# Patient Record
Sex: MALE | Race: WHITE | NOT HISPANIC OR LATINO | Employment: OTHER | URBAN - METROPOLITAN AREA
[De-identification: names, ages, dates, MRNs, and addresses within clinical notes are randomized per-mention and may not be internally consistent; named-entity substitution may affect disease eponyms.]

---

## 2017-01-04 ENCOUNTER — ANTICOAGULATION MONITORING (OUTPATIENT)
Dept: VASCULAR LAB | Facility: MEDICAL CENTER | Age: 82
End: 2017-01-04

## 2017-01-04 LAB — INR PPP: 2.3 (ref 2–3.5)

## 2017-01-04 NOTE — PROGRESS NOTES
OP Anticoagulation Telephone Note    Date: 1/4/2017  Anticoagulation Summary as of 1/4/2017     INR goal 2.0-3.0   Selected INR 2.3 (12/31/2016)   Maintenance plan 2 mg (2 mg x 1) on Mon, Wed; 4 mg (2 mg x 2) all other days   Weekly total 24 mg   No change documented Patricia Nur, Med Ass't   Plan last modified Ghazal Mckenzie, PHARMD (6/15/2016)   Next INR check 1/16/2017   Priority Routine   Target end date Indefinite    Indications   Atrial fibrillation [427.31] (Resolved) [I48.91]         Anticoagulation Episode Summary     INR check location     Preferred lab     Send INR reminders to     [facility]      Anticoagulation Care Providers     Provider Role Specialty Phone number    Bacilio Escalante M.D. Referring Cardiology [phone]    Eda Ugalde, ARTEMD Responsible          Anticoagulation Patient Findings   Negatives Missed Doses, Extra Doses, Medication Changes, Antibiotic Use, Diet Changes, Dental/Other Procedures, Hospitalization, Bleeding Gums, Nose Bleeds, Blood in Urine, Blood in Stool, Any Bruising, Other Complaints      Plan:  Left message on patient's answering machine/ voicemail. Instructed patient to call back with any concerns regarding any unusual bleeding or bruising, any medication or diet changes, or any signs or symptoms of thrombosis. Instructed patient to resume medication as outlined above. Patient to follow up in 2 weeks.      Patricia Nur, Medical Assistant    I have reviewed and agree with the plan above on  1/10/2017    Eda Ugalde, Pharm D

## 2017-01-16 LAB — INR PPP: 2.7 (ref 2–3.5)

## 2017-01-18 ENCOUNTER — ANTICOAGULATION MONITORING (OUTPATIENT)
Dept: VASCULAR LAB | Facility: MEDICAL CENTER | Age: 82
End: 2017-01-18

## 2017-01-18 NOTE — PROGRESS NOTES
Anticoagulation Summary as of 1/18/2017     INR goal 2.0-3.0   Selected INR 2.7 (1/16/2017)   Maintenance plan 2 mg (2 mg x 1) on Mon, Wed; 4 mg (2 mg x 2) all other days   Weekly total 24 mg   Plan last modified Ghazal Mckenzie, PHARMD (6/15/2016)   Next INR check 1/30/2017   Priority Routine   Target end date Indefinite    Indications   Atrial fibrillation [427.31] (Resolved) [I48.91]         Anticoagulation Episode Summary     INR check location     Preferred lab     Send INR reminders to     [facility]      Anticoagulation Care Providers     Provider Role Specialty Phone number    Bacilio Escalante M.D. Referring Cardiology [phone]    ARTEM VenturaD Responsible          Anticoagulation Patient Findings    Left voicemail message to report a therapeutic INR of 2.7.  Pt to continue with current warfarin dosing regimen. Requested pt contact the clinic for any s/s of unusual bleeding, bruising, clotting or any changes to diet or medication. FU 2 weeks.  Vincenzo Overton, PHARMD

## 2017-01-30 ENCOUNTER — ANTICOAGULATION MONITORING (OUTPATIENT)
Dept: VASCULAR LAB | Facility: MEDICAL CENTER | Age: 82
End: 2017-01-30

## 2017-01-30 LAB — INR PPP: 3.3 (ref 2–3.5)

## 2017-01-30 NOTE — PROGRESS NOTES
Anticoagulation Summary as of 1/30/2017     INR goal 2.0-3.0   Selected INR 3.3! (1/30/2017)   Maintenance plan 2 mg (2 mg x 1) on Mon, Wed; 4 mg (2 mg x 2) all other days   Weekly total 24 mg   Plan last modified Ghazal Mckenzie, PHARMD (6/15/2016)   Next INR check 2/13/2017   Priority Routine   Target end date Indefinite    Indications   Atrial fibrillation [427.31] (Resolved) [I48.91]         Anticoagulation Episode Summary     INR check location     Preferred lab     Send INR reminders to     [facility]      Anticoagulation Care Providers     Provider Role Specialty Phone number    Bacilio Escalante M.D. Referring Cardiology [phone]    ARTEM VenturaD Responsible          Anticoagulation Patient Findings   Positives Diet Changes    Negatives Missed Doses, Extra Doses, Medication Changes, Antibiotic Use, Dental/Other Procedures, Hospitalization, Bleeding Gums, Nose Bleeds, Blood in Urine, Blood in Stool, Any Bruising, Other Complaints    Comments Eating less greens recently        Spoke with patient today regarding supratherapeutic INR of 3.3.  Patient denies any signs/symptoms of bruising or bleeding or any changes in diet and medications.  Instructed patient to call clinic with any questions or concerns.  Patient was on vacation and eating less greens than he normally would.  Instructed him to HOLD X 1, then resume current warfarin regimen.  Follow up in 2 weeks.    Vincenzo Overton, PHARMD

## 2017-02-13 ENCOUNTER — APPOINTMENT (RX ONLY)
Dept: URBAN - METROPOLITAN AREA CLINIC 29 | Facility: CLINIC | Age: 82
Setting detail: DERMATOLOGY
End: 2017-02-13

## 2017-02-13 DIAGNOSIS — D18.0 HEMANGIOMA: ICD-10-CM

## 2017-02-13 DIAGNOSIS — L70.8 OTHER ACNE: ICD-10-CM

## 2017-02-13 DIAGNOSIS — Z85.828 PERSONAL HISTORY OF OTHER MALIGNANT NEOPLASM OF SKIN: ICD-10-CM

## 2017-02-13 DIAGNOSIS — Z85.820 PERSONAL HISTORY OF MALIGNANT MELANOMA OF SKIN: ICD-10-CM

## 2017-02-13 DIAGNOSIS — Z87.891 PERSONAL HISTORY OF NICOTINE DEPENDENCE: ICD-10-CM

## 2017-02-13 DIAGNOSIS — L90.5 SCAR CONDITIONS AND FIBROSIS OF SKIN: ICD-10-CM

## 2017-02-13 DIAGNOSIS — L82.1 OTHER SEBORRHEIC KERATOSIS: ICD-10-CM

## 2017-02-13 DIAGNOSIS — L81.4 OTHER MELANIN HYPERPIGMENTATION: ICD-10-CM

## 2017-02-13 DIAGNOSIS — L85.3 XEROSIS CUTIS: ICD-10-CM

## 2017-02-13 DIAGNOSIS — D69.2 OTHER NONTHROMBOCYTOPENIC PURPURA: ICD-10-CM

## 2017-02-13 DIAGNOSIS — L57.0 ACTINIC KERATOSIS: ICD-10-CM

## 2017-02-13 PROBLEM — N40.0 BENIGN PROSTATIC HYPERPLASIA WITHOUT LOWER URINARY TRACT SYMPTOMS: Status: ACTIVE | Noted: 2017-02-13

## 2017-02-13 PROBLEM — I48.91 UNSPECIFIED ATRIAL FIBRILLATION: Status: ACTIVE | Noted: 2017-02-13

## 2017-02-13 PROBLEM — D18.01 HEMANGIOMA OF SKIN AND SUBCUTANEOUS TISSUE: Status: ACTIVE | Noted: 2017-02-13

## 2017-02-13 PROBLEM — H91.90 UNSPECIFIED HEARING LOSS, UNSPECIFIED EAR: Status: ACTIVE | Noted: 2017-02-13

## 2017-02-13 PROBLEM — L57.8 OTHER SKIN CHANGES DUE TO CHRONIC EXPOSURE TO NONIONIZING RADIATION: Status: ACTIVE | Noted: 2017-02-13

## 2017-02-13 PROCEDURE — 99203 OFFICE O/P NEW LOW 30 MIN: CPT | Mod: 25

## 2017-02-13 PROCEDURE — ? COUNSELING

## 2017-02-13 PROCEDURE — ? OBSERVATION

## 2017-02-13 PROCEDURE — ? LIQUID NITROGEN

## 2017-02-13 PROCEDURE — ? OTHER

## 2017-02-13 PROCEDURE — 17003 DESTRUCT PREMALG LES 2-14: CPT

## 2017-02-13 PROCEDURE — 17000 DESTRUCT PREMALG LESION: CPT

## 2017-02-13 ASSESSMENT — LOCATION DETAILED DESCRIPTION DERM
LOCATION DETAILED: RIGHT PROXIMAL DORSAL FOREARM
LOCATION DETAILED: LEFT DISTAL LATERAL POSTERIOR UPPER ARM
LOCATION DETAILED: RIGHT SUPERIOR FOREHEAD
LOCATION DETAILED: RIGHT DISTAL DORSAL FOREARM
LOCATION DETAILED: LEFT SUPERIOR LATERAL BUCCAL CHEEK
LOCATION DETAILED: LEFT PROXIMAL DORSAL FOREARM
LOCATION DETAILED: LEFT SUPERIOR CENTRAL MALAR CHEEK
LOCATION DETAILED: STERNUM
LOCATION DETAILED: LEFT SUPERIOR MEDIAL UPPER BACK
LOCATION DETAILED: LEFT INFERIOR CENTRAL MALAR CHEEK
LOCATION DETAILED: LEFT SUPERIOR HELIX
LOCATION DETAILED: LEFT MEDIAL INFERIOR CHEST
LOCATION DETAILED: RIGHT CENTRAL FRONTAL SCALP
LOCATION DETAILED: RIGHT PROXIMAL POSTERIOR UPPER ARM
LOCATION DETAILED: LEFT DISTAL DORSAL FOREARM
LOCATION DETAILED: EPIGASTRIC SKIN
LOCATION DETAILED: SUPERIOR THORACIC SPINE
LOCATION DETAILED: LEFT DISTAL POSTERIOR UPPER ARM
LOCATION DETAILED: LEFT MEDIAL SUPERIOR CHEST
LOCATION DETAILED: RIGHT SUPERIOR MEDIAL UPPER BACK

## 2017-02-13 ASSESSMENT — LOCATION SIMPLE DESCRIPTION DERM
LOCATION SIMPLE: UPPER BACK
LOCATION SIMPLE: RIGHT FOREHEAD
LOCATION SIMPLE: RIGHT UPPER ARM
LOCATION SIMPLE: LEFT CHEEK
LOCATION SIMPLE: LEFT UPPER ARM
LOCATION SIMPLE: ABDOMEN
LOCATION SIMPLE: RIGHT UPPER BACK
LOCATION SIMPLE: LEFT UPPER BACK
LOCATION SIMPLE: CHEST
LOCATION SIMPLE: LEFT EAR
LOCATION SIMPLE: RIGHT FOREARM
LOCATION SIMPLE: LEFT FOREARM
LOCATION SIMPLE: RIGHT SCALP

## 2017-02-13 ASSESSMENT — LOCATION ZONE DERM
LOCATION ZONE: TRUNK
LOCATION ZONE: SCALP
LOCATION ZONE: EAR
LOCATION ZONE: FACE
LOCATION ZONE: ARM

## 2017-02-13 NOTE — PROCEDURE: COUNSELING
Detail Level: Simple
Detail Level: Zone
Detail Level: Detailed
Quality 224: Stage 0-Iic Melanoma: Overutilization Of Imaging Studies For Only Stage 0-Iic Melanoma: None of the following diagnostic imaging studies ordered: chest X-ray, CT, Ultrasound, MRI, PET, or nuclear medicine scans (ML)
Quality 137: Melanoma: Continuity Of Care - Recall System: Patient information entered into a recall system that includes: target date for the next exam specified AND a process to follow up with patients regarding missed or unscheduled appointments
Quality 226: Preventive Care And Screening: Tobacco Use: Screening And Cessation Intervention: Patient screened for tobacco and is an ex-smoker

## 2017-02-13 NOTE — PROCEDURE: LIQUID NITROGEN
Detail Level: Zone
Render Post-Care Instructions In Note?: yes
Duration Of Freeze Thaw-Cycle (Seconds): 0
Consent: The patient's consent was obtained including but not limited to risks of crusting, scabbing, blistering, scarring, darker or lighter pigmentary change, recurrence, incomplete removal and infection.
Post-Care Instructions: I reviewed with the patient in detail post-care instructions. Patient is to wear sunprotection, and avoid picking at any of the treated lesions. Pt may apply Vaseline to crusted or scabbing areas.

## 2017-02-13 NOTE — PROCEDURE: OTHER
Other (Free Text): Recommended yearly eye, dental exams and FSE in 6 months
Note Text (......Xxx Chief Complaint.): This diagnosis correlates with the
Detail Level: Detailed

## 2017-02-16 LAB — INR PPP: 2 (ref 2–3.5)

## 2017-02-17 ENCOUNTER — ANTICOAGULATION MONITORING (OUTPATIENT)
Dept: VASCULAR LAB | Facility: MEDICAL CENTER | Age: 82
End: 2017-02-17

## 2017-02-18 NOTE — PROGRESS NOTES
Anticoagulation Summary as of 2/17/2017     INR goal 2.0-3.0   Selected INR 2.0 (2/16/2017)   Maintenance plan 2 mg (2 mg x 1) on Mon, Wed; 4 mg (2 mg x 2) all other days   Weekly total 24 mg   Plan last modified Ghazal Mckenzie, PHARMD (6/15/2016)   Next INR check 3/2/2017   Priority Routine   Target end date Indefinite    Indications   Atrial fibrillation [427.31] (Resolved) [I48.91]         Anticoagulation Episode Summary     INR check location     Preferred lab     Send INR reminders to     [facility]      Anticoagulation Care Providers     Provider Role Specialty Phone number    Bacilio Escalante M.D. Referring Cardiology [phone]    Eda Ugalde PHARMD Responsible          Anticoagulation Patient Findings    Spoke with Gonsalo to report a therapeutic INR of 2.0 .Pt is to continue with current warfarin dosing regimen.  Pt denies any unusual s/s of bleeding, bruising, clotting or any changes to diet or medications.  Follow up in 2 weeks.    Ghazal Mckenzie PHARMD\

## 2017-03-18 LAB — INR PPP: 2.3 (ref 2–3.5)

## 2017-03-22 ENCOUNTER — ANTICOAGULATION MONITORING (OUTPATIENT)
Dept: VASCULAR LAB | Facility: MEDICAL CENTER | Age: 82
End: 2017-03-22

## 2017-03-22 NOTE — PROGRESS NOTES
Anticoagulation Summary as of 3/22/2017     INR goal 2.0-3.0   Selected INR 2.3 (3/18/2017)   Maintenance plan 2 mg (2 mg x 1) on Mon, Wed; 4 mg (2 mg x 2) all other days   Weekly total 24 mg   Plan last modified Ghazal Mckenzie, PHARMD (6/15/2016)   Next INR check 4/3/2017   Priority Routine   Target end date Indefinite    Indications   Atrial fibrillation [427.31] (Resolved) [I48.91]         Anticoagulation Episode Summary     INR check location     Preferred lab     Send INR reminders to     [facility]      Anticoagulation Care Providers     Provider Role Specialty Phone number    Bacilio Escalante M.D. Referring Cardiology [phone]    ARTEM VenturaD Responsible          Anticoagulation Patient Findings    Left voicemail message to report a therapeutic INR of 2.3.  Pt to continue with current warfarin dosing regimen. Requested pt contact the clinic for any s/s of unusual bleeding, bruising, clotting or any changes to diet or medication. FU 2 weeks.  Vincenzo Overton, PHARMD

## 2017-03-31 ENCOUNTER — ANTICOAGULATION MONITORING (OUTPATIENT)
Dept: VASCULAR LAB | Facility: MEDICAL CENTER | Age: 82
End: 2017-03-31

## 2017-03-31 LAB — INR PPP: 1.8 (ref 2–3.5)

## 2017-04-15 LAB — INR PPP: 2 (ref 2–3.5)

## 2017-04-17 ENCOUNTER — ANTICOAGULATION MONITORING (OUTPATIENT)
Dept: VASCULAR LAB | Facility: MEDICAL CENTER | Age: 82
End: 2017-04-17

## 2017-04-17 NOTE — PROGRESS NOTES
Anticoagulation Summary as of 4/17/2017     INR goal 2.0-3.0   Selected INR 2.0 (4/15/2017)   Maintenance plan 2 mg (2 mg x 1) on Mon, Wed; 4 mg (2 mg x 2) all other days   Weekly total 24 mg   No change documented Delia Ngo   Plan last modified Ghazal Mckenzie, PHARMD (6/15/2016)   Next INR check 4/29/2017   Priority Routine   Target end date Indefinite    Indications   Atrial fibrillation [427.31] (Resolved) [I48.91]         Anticoagulation Episode Summary     INR check location     Preferred lab     Send INR reminders to     [facility]      Anticoagulation Care Providers     Provider Role Specialty Phone number    Bacilio Escalante M.D. Referring Cardiology [phone]    ARTEM VenturaD Responsible          Anticoagulation Patient Findings    Left a message on the patient's voicemail today, informing the patient of a therapeutic INR of 2.0.  Instructed patient to call the clinic with any changes to diet or medications, with any questions/concerns, or with any signs/sx of bleeding or clotting.  Patient will continue with the current warfarin dosing regimen, and will follow up again in 2 weeks.    Gino PizarroD

## 2017-04-30 LAB — INR PPP: 3 (ref 2–3.5)

## 2017-05-02 ENCOUNTER — ANTICOAGULATION MONITORING (OUTPATIENT)
Dept: VASCULAR LAB | Facility: MEDICAL CENTER | Age: 82
End: 2017-05-02

## 2017-05-02 NOTE — PROGRESS NOTES
Anticoagulation Summary as of 5/2/2017     INR goal 2.0-3.0   Selected INR 3.0 (4/30/2017)   Maintenance plan 2 mg (2 mg x 1) on Mon, Wed; 4 mg (2 mg x 2) all other days   Weekly total 24 mg   Plan last modified Ghazal Mckenzie, ASCENCION (6/15/2016)   Next INR check 5/14/2017   Priority Routine   Target end date Indefinite    Indications   Atrial fibrillation [427.31] (Resolved) [I48.91]         Anticoagulation Episode Summary     INR check location     Preferred lab     Send INR reminders to     [facility]      Anticoagulation Care Providers     Provider Role Specialty Phone number    Bacilio Escalante M.D. Referring Cardiology [phone]    Eda Ugalde PHARMD Responsible            Left voicemail message to report a therapeutic INR of 3.0.  Pt to continue with current warfarin dosing regimen. Requested pt contact the clinic for any s/s of unusual bleeding, bruising, clotting or any changes to diet or medication. FU 2 weeks.  Ghazal Mckenzie, ARTEMD

## 2017-05-15 LAB — INR PPP: 2 (ref 2–3.5)

## 2017-05-16 ENCOUNTER — ANTICOAGULATION MONITORING (OUTPATIENT)
Dept: VASCULAR LAB | Facility: MEDICAL CENTER | Age: 82
End: 2017-05-16

## 2017-05-16 NOTE — PROGRESS NOTES
Anticoagulation Summary as of 5/16/2017     INR goal 2.0-3.0   Selected INR 2.0 (5/15/2017)   Maintenance plan 2 mg (2 mg x 1) on Mon, Wed; 4 mg (2 mg x 2) all other days   Weekly total 24 mg   Plan last modified Ghazal Mckenzie, ASCENCION (6/15/2016)   Next INR check 5/29/2017   Priority Routine   Target end date Indefinite    Indications   Atrial fibrillation [427.31] (Resolved) [I48.91]         Anticoagulation Episode Summary     INR check location     Preferred lab     Send INR reminders to     [facility]      Anticoagulation Care Providers     Provider Role Specialty Phone number    Bacilio Escalante M.D. Referring Cardiology [phone]    Eda Ugalde PHARMD Responsible          Anticoagulation Patient Findings    Left voicemail message to report a therapeutic INR of 2.0.  Pt to continue with current warfarin dosing regimen. Requested pt contact the clinic for any s/s of unusual bleeding, bruising, clotting or any changes to diet or medication. FU 2 weeks.  Ghazal Mckenzie, ARTEMD

## 2017-05-29 LAB — INR PPP: 2.3 (ref 2–3.5)

## 2017-05-30 ENCOUNTER — ANTICOAGULATION MONITORING (OUTPATIENT)
Dept: VASCULAR LAB | Facility: MEDICAL CENTER | Age: 82
End: 2017-05-30

## 2017-05-30 NOTE — PROGRESS NOTES
OP Anticoagulation Service Note    Date: 5/30/2017    Anticoagulation Summary as of 5/30/2017     INR goal 2.0-3.0   Selected INR 2.3 (5/29/2017)   Maintenance plan 2 mg (2 mg x 1) on Mon, Wed; 4 mg (2 mg x 2) all other days   Weekly total 24 mg   Plan last akira Mckenzie PHARMD (6/15/2016)   Next INR check 6/12/2017   Priority Routine   Target end date Indefinite    Indications   Atrial fibrillation [427.31] (Resolved) [I48.91]         Anticoagulation Episode Summary     INR check location     Preferred lab     Send INR reminders to     Comments Anal[facility] Ver[facility]      Anticoagulation Care Providers     Provider Role Specialty Phone number    Bacilio Escalante M.D. Referring Cardiology [phone]    Eda Ugalde PHARMD Responsible          Anticoagulation Patient Findings   Negatives Missed Doses, Extra Doses, Medication Changes, Antibiotic Use, Diet Changes, Dental/Other Procedures, Hospitalization, Bleeding Gums, Nose Bleeds, Blood in Urine, Blood in Stool, Any Bruising, Other Complaints          Plan:  Spoke with patient on the phone. Patient is therapeutic today. Confirmed dosing. No missed tablets in the last week. Patient denies any changes in medications or diet. Patient denies any signs or symptoms of bleeding or clotting. Instructed patient to call clinic if any unusual bleeding or bruising occurs. Will continue dosing as outlined below. Will follow-up with patient in 2 week(s).        Eda Ugalde PHARMD

## 2017-06-20 ENCOUNTER — ANTICOAGULATION MONITORING (OUTPATIENT)
Dept: VASCULAR LAB | Facility: MEDICAL CENTER | Age: 82
End: 2017-06-20

## 2017-06-20 LAB — INR PPP: 2.2 (ref 2–3.5)

## 2017-06-20 NOTE — PROGRESS NOTES
OP Anticoagulation Service Note    Date: 6/20/2017    Anticoagulation Summary as of 6/20/2017     INR goal 2.0-3.0   Selected INR 2.2 (6/20/2017)   Maintenance plan 2 mg (2 mg x 1) on Mon, Wed; 4 mg (2 mg x 2) all other days   Weekly total 24 mg   Plan last akira Mckenzie PHARMD (6/15/2016)   Next INR check 7/4/2017   Priority Routine   Target end date Indefinite    Indications   Atrial fibrillation [427.31] (Resolved) [I48.91]         Anticoagulation Episode Summary     INR check location     Preferred lab     Send INR reminders to     Comments [facility] Veras      Anticoagulation Care Providers     Provider Role Specialty Phone number    Bacilio Escalante M.D. Referring Cardiology [phone]    Eda Ugalde PHARMD Responsible          Anticoagulation Patient Findings   Negatives Missed Doses, Extra Doses, Medication Changes, Antibiotic Use, Diet Changes, Dental/Other Procedures, Hospitalization, Bleeding Gums, Nose Bleeds, Blood in Urine, Blood in Stool, Any Bruising, Other Complaints          Plan:  Spoke with patient on the phone. Patient is therapeutic today. Confirmed dosing. No missed tablets in the last week. Patient denies any changes in medications or diet. Patient denies any signs or symptoms of bleeding or clotting. Instructed patient to call clinic if any unusual bleeding or bruising occurs. Will continue dosing as outlined below. Will follow-up with patient in 2 week(s).        Eda Ugalde PHARMD

## 2017-06-30 ENCOUNTER — ANTICOAGULATION MONITORING (OUTPATIENT)
Dept: VASCULAR LAB | Facility: MEDICAL CENTER | Age: 82
End: 2017-06-30

## 2017-06-30 LAB — INR PPP: 1.9 (ref 2–3.5)

## 2017-06-30 NOTE — PROGRESS NOTES
Anticoagulation Summary as of 6/30/2017     INR goal 2.0-3.0   Selected INR 1.9! (6/30/2017)   Maintenance plan 2 mg (2 mg x 1) on Wed; 4 mg (2 mg x 2) all other days   Weekly total 26 mg   Plan last modified Kodak Carr, PHARMD (6/30/2017)   Next INR check 7/14/2017   Priority Routine   Target end date Indefinite    Indications   Atrial fibrillation [427.31] (Resolved) [I48.91]         Anticoagulation Episode Summary     INR check location     Preferred lab     Send INR reminders to     [facility]      Anticoagulation Care Providers     Provider Role Specialty Phone number    Bacilio Escalante M.D. Referring Cardiology [phone]    ARTEM VenturaD Responsible          Anticoagulation Patient Findings    Left voicemail message to report a SUB therapeutic INR of 1.9.  Pt to begin 8% increased warfarin dosing regimen. Requested pt contact the clinic for any s/s of unusual bleeding, bruising, clotting or any changes to diet or medication. FU 2 weeks.    Kodak Carr, PHARMD

## 2017-07-03 ENCOUNTER — ANTICOAGULATION MONITORING (OUTPATIENT)
Dept: VASCULAR LAB | Facility: MEDICAL CENTER | Age: 82
End: 2017-07-03

## 2017-07-15 LAB — INR PPP: 2.2 (ref 2–3.5)

## 2017-07-17 ENCOUNTER — ANTICOAGULATION MONITORING (OUTPATIENT)
Dept: VASCULAR LAB | Facility: MEDICAL CENTER | Age: 82
End: 2017-07-17

## 2017-07-17 NOTE — PROGRESS NOTES
Anticoagulation Summary as of 7/17/2017     INR goal 2.0-3.0   Selected INR 2.2 (7/15/2017)   Maintenance plan 2 mg (2 mg x 1) on Mon, Wed; 4 mg (2 mg x 2) all other days   Weekly total 24 mg   Plan last modified Kodak Carr, PHARMD (7/17/2017)   Next INR check 7/28/2017   Priority Routine   Target end date Indefinite    Indications   Atrial fibrillation [427.31] (Resolved) [I48.91]         Anticoagulation Episode Summary     INR check location     Preferred lab     Send INR reminders to     Comments [facility]      Anticoagulation Care Providers     Provider Role Specialty Phone number    Bacilio Escalante M.D. Referring Cardiology [phone]    Eda Ugalde, ARTEMD Responsible          Anticoagulation Patient Findings    Patient's INR was therapeutic.   Pt was explaining what sounded like a possible hematoma on his right elbow.  Pt has already sought medical help and is returning to his physician again today to have it assessed.  Asked pt to update the clinic if he changes any medications or finds out he has bleeding.   Denies any changes to:   Diet   Medications  Confirmed dosing regimen.    Pt is to continue with current warfarin dosing regimen.    Follow up in 2 week(s)    Kodak Carr, ARTEMD

## 2017-07-19 ENCOUNTER — ANTICOAGULATION MONITORING (OUTPATIENT)
Dept: VASCULAR LAB | Facility: MEDICAL CENTER | Age: 82
End: 2017-07-19

## 2017-07-19 LAB — INR PPP: 2.8 (ref 2–3.5)

## 2017-07-19 NOTE — PROGRESS NOTES
Anticoagulation Summary as of 7/19/2017     INR goal 2.0-3.0   Selected INR 2.8 (7/19/2017)   Maintenance plan 2 mg (2 mg x 1) on Mon, Wed; 4 mg (2 mg x 2) all other days   Weekly total 24 mg   Plan last modified Kodak Carr, PHARMD (7/17/2017)   Next INR check 7/24/2017   Priority Routine   Target end date Indefinite    Indications   Atrial fibrillation [427.31] (Resolved) [I48.91]         Anticoagulation Episode Summary     INR check location     Preferred lab     Send INR reminders to     [facility]      Anticoagulation Care Providers     Provider Role Specialty Phone number    Bacilio Escalante M.D. Referring Cardiology [phone]    Johnna Johnson, PHARMD           Anticoagulation Patient Findings      Spoke with wife report a therapeutic INR of 2.8.  However pt has been on Bactrim for the past 2 days, and appears that he will be finished with it after Sunday. Due to the DDI, will have pt take a reduced dose of 2mg on Friday 7/21/17 and on Sunday 7/23/17.  Requested pt contact the clinic for any s/s of unusual bleeding, bruising, clotting or any changes to diet or medication.    FU INR in 5 days.    Johnna Johnson, PHARMD

## 2017-07-22 LAB — INR PPP: 2.8 (ref 2–3.5)

## 2017-07-24 ENCOUNTER — ANTICOAGULATION MONITORING (OUTPATIENT)
Dept: VASCULAR LAB | Facility: MEDICAL CENTER | Age: 82
End: 2017-07-24

## 2017-07-24 LAB
INR PPP: 2.2 (ref 2–3.5)
INR PPP: 2.2 (ref 2–3.5)

## 2017-07-24 NOTE — PROGRESS NOTES
Anticoagulation Summary as of 7/24/2017     INR goal 2.0-3.0   Selected INR 2.8 (7/22/2017)   Maintenance plan 2 mg (2 mg x 1) on Mon, Wed; 4 mg (2 mg x 2) all other days   Weekly total 24 mg   Plan last modified Kodak Carr, PHARMD (7/17/2017)   Next INR check 8/7/2017   Priority Routine   Target end date Indefinite    Indications   Atrial fibrillation [427.31] (Resolved) [I48.91]         Anticoagulation Episode Summary     INR check location     Preferred lab     Send INR reminders to     [facility]      Anticoagulation Care Providers     Provider Role Specialty Phone number    Bacilio Escalante M.D. Referring Cardiology [phone]    Johnna Johnson, ARTEMD           Anticoagulation Patient Findings    Left voicemail message to report a therapeutic INR of 2.8.  Per last note, appears pt has finished Bactrim.  Pt to continue with current warfarin dosing regimen. Requested pt contact the clinic for any s/s of unusual bleeding, bruising, clotting or any changes to diet or medication. FU 2 weeks.      Kodak Carr, PHARMD

## 2017-07-24 NOTE — PROGRESS NOTES
Anticoagulation Summary as of 7/24/2017     INR goal 2.0-3.0   Selected INR 2.2 (7/24/2017)   Maintenance plan 2 mg (2 mg x 1) on Mon, Wed; 4 mg (2 mg x 2) all other days   Weekly total 24 mg   Plan last modified Kodak Carr, PHARMD (7/17/2017)   Next INR check 7/28/2017   Priority Routine   Target end date Indefinite    Indications   Atrial fibrillation [427.31] (Resolved) [I48.91]         Anticoagulation Episode Summary     INR check location     Preferred lab     Send INR reminders to     [facility]      Anticoagulation Care Providers     Provider Role Specialty Phone number    Bacilio Escalante M.D. Referring Cardiology [phone]    Johnna Johnson, PHARMD           Anticoagulation Patient Findings      Left voicemail message to report a therapeutic INR of 2.2 from today.  Pt also indicated that he is to continue on Septra for 10 more days.     Due to this will have pt take a reduced dose of warfarin as follows:  Monday - 2mg  Tuesday - 4mg  Wednesday - 2mg  Thursday - 2mg     Requested pt contact the clinic for any s/s of unusual bleeding, bruising, clotting or any changes to diet or medication.    Recheck INR on Friday.       Johnna Johnson, PHARMD

## 2017-07-28 ENCOUNTER — ANTICOAGULATION MONITORING (OUTPATIENT)
Dept: VASCULAR LAB | Facility: MEDICAL CENTER | Age: 82
End: 2017-07-28

## 2017-07-28 LAB — INR PPP: 1.8 (ref 2–3.5)

## 2017-07-28 NOTE — PROGRESS NOTES
Anticoagulation Summary as of 7/28/2017     INR goal 2.0-3.0   Selected INR 1.8! (7/28/2017)   Maintenance plan 2 mg (2 mg x 1) on Mon, Wed; 4 mg (2 mg x 2) all other days   Weekly total 24 mg   Plan last modified Kodak Carr, PHARMD (7/17/2017)   Next INR check 8/1/2017   Priority Routine   Target end date Indefinite    Indications   Atrial fibrillation [427.31] (Resolved) [I48.91]         Anticoagulation Episode Summary     INR check location     Preferred lab     Send INR reminders to     [facility]      Anticoagulation Care Providers     Provider Role Specialty Phone number    Bacilio Escalante M.D. Referring Cardiology [phone]    Johnna Johnson, PHARMD           Anticoagulation Patient Findings      Left voicemail message to report a SUB therapeutic INR of 1.8.    As pt is still on Septra, will not dose adjust.   Pt to continue with current warfarin dosing regimen. Requested pt contact the clinic for any s/s of unusual bleeding, bruising, clotting or any changes to diet or medication.    FU INR in 4 days.    Johnna Johnson, PHARMD

## 2017-08-15 ENCOUNTER — ANTICOAGULATION MONITORING (OUTPATIENT)
Dept: VASCULAR LAB | Facility: MEDICAL CENTER | Age: 82
End: 2017-08-15

## 2017-08-15 LAB — INR PPP: 2.6 (ref 2–3.5)

## 2017-08-15 NOTE — PROGRESS NOTES
Anticoagulation Summary as of 8/15/2017     INR goal 2.0-3.0   Selected INR 2.6 (8/15/2017)   Maintenance plan 2 mg (2 mg x 1) on Mon, Wed; 4 mg (2 mg x 2) all other days   Weekly total 24 mg   Plan last modified Kodak Carr, PHARMD (7/17/2017)   Next INR check 8/29/2017   Priority Routine   Target end date Indefinite    Indications   Atrial fibrillation [427.31] (Resolved) [I48.91]         Anticoagulation Episode Summary     INR check location     Preferred lab     Send INR reminders to     [facility]      Anticoagulation Care Providers     Provider Role Specialty Phone number    Bacilio Escalante M.D. Referring Cardiology [phone]    Johnna Johnson, PHARMD           Anticoagulation Patient Findings      Left voicemail message to report a therapeutic INR of 2.6.    Pt to continue with current warfarin dosing regimen. Requested pt contact the clinic for any s/s of unusual bleeding, bruising, clotting or any changes to diet or medication.    FU INR in 2 weeks.    Johnna Johnson, PHARMD

## 2017-08-31 LAB — INR PPP: 2.3 (ref 2–3.5)

## 2017-09-01 ENCOUNTER — ANTICOAGULATION MONITORING (OUTPATIENT)
Dept: VASCULAR LAB | Facility: MEDICAL CENTER | Age: 82
End: 2017-09-01

## 2017-09-02 NOTE — PROGRESS NOTES
Anticoagulation Summary  As of 9/1/2017    INR goal:   2.0-3.0   TTR:   79.9 % (2.3 y)   Today's INR:   2.3 (8/31/2017)   Maintenance plan:   2 mg (2 mg x 1) on Mon, Wed; 4 mg (2 mg x 2) all other days   Weekly total:   24 mg   Plan last modified:   Kodak Carr, PharmD (7/17/2017)   Next INR check:   9/21/2017   Priority:   Routine   Target end date:   Indefinite    Indications    Atrial fibrillation [427.31] (Resolved) [I48.91]             Anticoagulation Episode Summary     INR check location:   Home Draw    Preferred lab:       Send INR reminders to:       Comments:   Anal[facility] Veras      Anticoagulation Care Providers     Provider Role Specialty Phone number    Bacilio Escalante M.D. Referring Cardiology [phone]    Johnna Johnson, PharmD           Anticoagulation Patient Findings    Left voicemail message to report a therapeutic INR of 2.3.  Pt to continue with current warfarin dosing regimen. Requested pt contact the clinic for any s/s of unusual bleeding, bruising, clotting or any changes to diet or medication. FU 2 weeks.  Ghazal Mckenzie, PharmD

## 2017-09-18 LAB — INR PPP: 2.2 (ref 2–3.5)

## 2017-09-19 ENCOUNTER — ANTICOAGULATION MONITORING (OUTPATIENT)
Dept: VASCULAR LAB | Facility: MEDICAL CENTER | Age: 82
End: 2017-09-19

## 2017-09-19 NOTE — PROGRESS NOTES
Anticoagulation Summary  As of 9/19/2017    INR goal:   2.0-3.0   TTR:   80.3 % (2.3 y)   Today's INR:   2.2 (9/18/2017)   Maintenance plan:   2 mg (2 mg x 1) on Mon, Wed; 4 mg (2 mg x 2) all other days   Weekly total:   24 mg   Plan last modified:   Kodak Carr, PharmD (7/17/2017)   Next INR check:      Priority:   Routine   Target end date:   Indefinite    Indications    Atrial fibrillation [427.31] (Resolved) [I48.91]             Anticoagulation Episode Summary     INR check location:   Home Draw    Preferred lab:       Send INR reminders to:       Comments:   Analilia Veras      Anticoagulation Care Providers     Provider Role Specialty Phone number    Bacilio Escalante M.D. Referring Cardiology [phone]    Johnna Johnson, PharmD           See note by Jeanette Mckenzie, PharmD

## 2017-09-27 DIAGNOSIS — I48.92 ATRIAL FLUTTER, UNSPECIFIED TYPE (HCC): ICD-10-CM

## 2017-09-27 RX ORDER — WARFARIN SODIUM 2 MG/1
TABLET ORAL
Qty: 180 TAB | Refills: 1 | Status: SHIPPED | OUTPATIENT
Start: 2017-09-27 | End: 2017-09-28 | Stop reason: SDUPTHER

## 2017-09-28 ENCOUNTER — ANTICOAGULATION MONITORING (OUTPATIENT)
Dept: VASCULAR LAB | Facility: MEDICAL CENTER | Age: 82
End: 2017-09-28

## 2017-09-28 DIAGNOSIS — I48.92 ATRIAL FLUTTER, UNSPECIFIED TYPE (HCC): ICD-10-CM

## 2017-09-28 RX ORDER — WARFARIN SODIUM 2 MG/1
TABLET ORAL
Qty: 180 TAB | Refills: 1 | Status: SHIPPED | OUTPATIENT
Start: 2017-09-28 | End: 2017-09-29 | Stop reason: SDUPTHER

## 2017-10-02 ENCOUNTER — ANTICOAGULATION MONITORING (OUTPATIENT)
Dept: VASCULAR LAB | Facility: MEDICAL CENTER | Age: 82
End: 2017-10-02

## 2017-10-02 LAB — INR PPP: 2.3 (ref 2–3.5)

## 2017-10-02 NOTE — PROGRESS NOTES
OP Anticoagulation Telephone Note    Date: 10/2/2017  Anticoagulation Summary  As of 10/2/2017    INR goal:   2.0-3.0   TTR:   80.6 % (2.4 y)   Today's INR:   2.3 (9/30/2017)   Maintenance plan:   2 mg (2 mg x 1) on Mon, Wed; 4 mg (2 mg x 2) all other days   Weekly total:   24 mg   No change documented:   Patricia Nur, Med Ass't   Plan last modified:   Kodak Carr, Michael (7/17/2017)   Next INR check:   10/16/2017   Priority:   Routine   Target end date:   Indefinite    Indications    Atrial fibrillation [427.31] (Resolved) [I48.91]             Anticoagulation Episode Summary     INR check location:   Home Draw    Preferred lab:       Send INR reminders to:       Comments:   Analilia Veras      Anticoagulation Care Providers     Provider Role Specialty Phone number    Bacilio Escalante M.D. Referring Cardiology [phone]    Moira CalderonD           Anticoagulation Patient Findings  Patient Findings     Negatives:   Signs/symptoms of thrombosis, Signs/symptoms of bleeding, Laboratory test error suspected, Change in health, Change in alcohol use, Change in activity, Upcoming invasive procedure, Emergency department visit, Upcoming dental procedure, Missed doses, Extra doses, Change in medications, Change in diet/appetite, Hospital admission, Bruising, Other complaints      Plan:  Left message on patient's answering machine/ voicemail. Instructed patient to call back with any concerns regarding any unusual bleeding or bruising, any medication or diet changes, or any signs or symptoms of thrombosis. Instructed patient to resume medication as outlined above. Patient to follow up in 2 weeks.     Patricia Nur, Medical Assistant    10/2/2017    Concur with plan outlined above    Carmelo Penny, Michael

## 2017-10-04 PROBLEM — I73.9 SMALL VESSEL DISEASE (HCC): Status: ACTIVE | Noted: 2017-10-04

## 2017-10-15 LAB — INR PPP: 2.5 (ref 2–3.5)

## 2017-10-16 ENCOUNTER — ANTICOAGULATION MONITORING (OUTPATIENT)
Dept: VASCULAR LAB | Facility: MEDICAL CENTER | Age: 82
End: 2017-10-16

## 2017-10-16 NOTE — PROGRESS NOTES
Anticoagulation Summary  As of 10/16/2017    INR goal:   2.0-3.0   TTR:   80.5 % (2.4 y)   Today's INR:   2.5 (10/15/2017)   Maintenance plan:   2 mg (2 mg x 1) on Mon, Wed; 4 mg (2 mg x 2) all other days   Weekly total:   24 mg   No change documented:   Liv SELBY'Marguerite, Med Ass't   Plan last modified:   Moira ContrerasD (7/17/2017)   Next INR check:   10/29/2017   Priority:   Routine   Target end date:   Indefinite    Indications    Atrial fibrillation [427.31] (Resolved) [I48.91]             Anticoagulation Episode Summary     INR check location:   Home Draw    Preferred lab:       Send INR reminders to:       Comments:   Analilia Veras      Anticoagulation Care Providers     Provider Role Specialty Phone number    Bacilio Escalante M.D. Referring Cardiology [phone]    Moira CalderonD           Anticoagulation Patient Findings  Patient Findings     Negatives:   Signs/symptoms of thrombosis, Signs/symptoms of bleeding, Laboratory test error suspected, Change in health, Change in alcohol use, Change in activity, Upcoming invasive procedure, Emergency department visit, Upcoming dental procedure, Missed doses, Extra doses, Change in medications, Change in diet/appetite, Hospital admission, Bruising, Other complaints        Spoke with patient to report a therapeutic INR.  Pt instructed to continue with current warfarin dosing regimen. Pt denies any s/s of bleeding, bruising, clotting or any changes to diet or medication.  Will follow up in 2 weeks.    Liv Ritchie, Med Ass't    10/17/2017    Concur with plan outlined above    Carmelo Penny, Michael

## 2017-10-30 ENCOUNTER — ANTICOAGULATION MONITORING (OUTPATIENT)
Dept: VASCULAR LAB | Facility: MEDICAL CENTER | Age: 82
End: 2017-10-30

## 2017-10-30 LAB — INR PPP: 2.5 (ref 2–3.5)

## 2017-10-30 NOTE — PROGRESS NOTES
Anticoagulation Summary  As of 10/30/2017    INR goal:   2.0-3.0   TTR:   80.8 % (2.5 y)   Today's INR:   2.5   Maintenance plan:   2 mg (2 mg x 1) on Mon, Wed; 4 mg (2 mg x 2) all other days   Weekly total:   24 mg   Plan last modified:   Moira ContrerasD (7/17/2017)   Next INR check:   11/13/2017   Priority:   Routine   Target end date:   Indefinite    Indications    Atrial fibrillation [427.31] (Resolved) [I48.91]             Anticoagulation Episode Summary     INR check location:   Home Draw    Preferred lab:       Send INR reminders to:       Comments:   Analilia Veras      Anticoagulation Care Providers     Provider Role Specialty Phone number    Bacilio Escalante M.D. Referring Cardiology [phone]    Johnna Johnson, PharmD           Anticoagulation Patient Findings      Spoke with patient to report a therapeutic INR.    Pt instructed to continue with current warfarin dosing regimen, confirms dosing.   Pt denies any s/s of bleeding, bruising, clotting or any changes to diet or medication.    Will follow up in 2 weeks.     Johnna Johnson, PharmD

## 2017-11-15 ENCOUNTER — ANTICOAGULATION MONITORING (OUTPATIENT)
Dept: VASCULAR LAB | Facility: MEDICAL CENTER | Age: 82
End: 2017-11-15

## 2017-11-15 LAB — INR PPP: 2 (ref 2–3.5)

## 2017-11-15 NOTE — PROGRESS NOTES
Anticoagulation Summary  As of 11/15/2017    INR goal:   2.0-3.0   TTR:   81.1 % (2.5 y)   Today's INR:   2.0   Maintenance plan:   2 mg (2 mg x 1) on Mon, Wed; 4 mg (2 mg x 2) all other days   Weekly total:   24 mg   Plan last modified:   Kodak Carr, PharmD (7/17/2017)   Next INR check:   11/29/2017   Priority:   Routine   Target end date:   Indefinite    Indications    Atrial fibrillation [427.31] (Resolved) [I48.91]             Anticoagulation Episode Summary     INR check location:   Home Draw    Preferred lab:       Send INR reminders to:       Comments:   Analilia Veras      Anticoagulation Care Providers     Provider Role Specialty Phone number    Bacilio Escalante M.D. Referring Cardiology [phone]    Johnna Johnson, MoiraD           Anticoagulation Patient Findings    Left voicemail message to report a therapeutic INR of 2.0.  Pt to continue with current warfarin dosing regimen. Requested pt contact the clinic for any s/s of unusual bleeding, bruising, clotting or any changes to diet or medication. FU 2 weeks.  Vincenzo Overton, PharmD

## 2017-12-05 ENCOUNTER — ANTICOAGULATION MONITORING (OUTPATIENT)
Dept: VASCULAR LAB | Facility: MEDICAL CENTER | Age: 82
End: 2017-12-05

## 2017-12-05 LAB — INR PPP: 2 (ref 2–3.5)

## 2017-12-05 NOTE — PROGRESS NOTES
OP Anticoagulation Telephone Note    Date: 12/5/2017  Anticoagulation Summary  As of 12/5/2017    INR goal:   2.0-3.0   TTR:   81.5 % (2.6 y)   Today's INR:   2.0 (12/3/2017)   Maintenance plan:   2 mg (2 mg x 1) on Mon, Wed; 4 mg (2 mg x 2) all other days   Weekly total:   24 mg   No change documented:   Patricia Nur, Med Ass't   Plan last modified:   Kodak Carr, Michael (7/17/2017)   Next INR check:   12/15/2017   Priority:   Routine   Target end date:   Indefinite    Indications    Atrial fibrillation [427.31] (Resolved) [I48.91]             Anticoagulation Episode Summary     INR check location:   Home Draw    Preferred lab:       Send INR reminders to:       Comments:   Analilia Oropeza      Anticoagulation Care Providers     Provider Role Specialty Phone number    Bacilio Escalante M.D. Referring Cardiology [phone]    Moira CalderonD           Anticoagulation Patient Findings  Patient Findings     Negatives:   Signs/symptoms of thrombosis, Signs/symptoms of bleeding, Laboratory test error suspected, Change in health, Change in alcohol use, Change in activity, Upcoming invasive procedure, Emergency department visit, Upcoming dental procedure, Missed doses, Extra doses, Change in medications, Change in diet/appetite, Hospital admission, Bruising, Other complaints      Plan:  Spoke with patient on the phone. Patient is therapeutic today. Patient denies any changes in medications or diet. Patient denies any signs or symptoms of bleeding or clotting. Instructed patient to call clinic if any unusual bleeding or bruising occurs. Will continue dosing as outlined above. Will follow-up with patient in 2 weeks.      Patricia Nur, Medical Assistant    12/6/2017    Concur with plan outlined above    Carmelo Penny, Michael

## 2017-12-15 ENCOUNTER — ANTICOAGULATION MONITORING (OUTPATIENT)
Dept: VASCULAR LAB | Facility: MEDICAL CENTER | Age: 82
End: 2017-12-15

## 2017-12-15 LAB — INR PPP: 1.8 (ref 2–3.5)

## 2017-12-15 NOTE — PROGRESS NOTES
Anticoagulation Summary  As of 12/15/2017    INR goal:   2.0-3.0   TTR:   80.5 % (2.6 y)   Today's INR:   1.8!   Maintenance plan:   2 mg (2 mg x 1) on Mon, Wed; 4 mg (2 mg x 2) all other days   Weekly total:   24 mg   Plan last modified:   Kodak Carr, PharmD (7/17/2017)   Next INR check:   12/29/2017   Priority:   Routine   Target end date:   Indefinite    Indications    Atrial fibrillation [427.31] (Resolved) [I48.91]             Anticoagulation Episode Summary     INR check location:   Home Draw    Preferred lab:       Send INR reminders to:       Comments:   Analilia Oropeza      Anticoagulation Care Providers     Provider Role Specialty Phone number    Bacilio Escalante M.D. Referring Cardiology [phone]    Johnna Johnson, PharmD           Anticoagulation Patient Findings      Spoke with patient.  INR is SUB therapeutic.   Pt denies any unusual s/s of bleeding, bruising, clotting or any changes to diet or medications. Denies any etoh, cranberries, supplements, or illness.   Pt verifies warfarin weekly dosing.     Will have pt take a boost dose of 6mg x1 and then resume his normal warfarin dosing.     Repeat INR in 2 weeks.     Johnna Johnson, PharmD

## 2017-12-31 LAB — INR PPP: 2.1 (ref 2–3.5)

## 2018-01-02 ENCOUNTER — ANTICOAGULATION MONITORING (OUTPATIENT)
Dept: VASCULAR LAB | Facility: MEDICAL CENTER | Age: 83
End: 2018-01-02

## 2018-01-02 NOTE — PROGRESS NOTES
OP Telephone Anticoagulation Service Note    Date: 1/2/2018      Anticoagulation Summary  As of 1/2/2018    INR goal:   2.0-3.0   TTR:   79.7 % (2.6 y)   Today's INR:   2.1 (12/31/2017)   Maintenance plan:   2 mg (2 mg x 1) on Mon, Wed; 4 mg (2 mg x 2) all other days   Weekly total:   24 mg   Plan last modified:   Kodak Carr, PharmD (7/17/2017)   Next INR check:   1/16/2018   Priority:   Routine   Target end date:   Indefinite    Indications    Atrial fibrillation [427.31] (Resolved) [I48.91]             Anticoagulation Episode Summary     INR check location:   Home Draw    Preferred lab:       Send INR reminders to:       Comments:   Analilia Oropeza      Anticoagulation Care Providers     Provider Role Specialty Phone number    Bacilio Escalante M.D. Referring Cardiology [phone]    Johnna Johnson, MoiraD           Anticoagulation Patient Findings        Plan: Spoke with patient on the phone. Patient is therapeutic today. Confirmed dosing. No missed tablets in the last week. Patient denies any changes in medications or diet. Patient denies any signs or symptoms of bleeding or clotting. Instructed patient to call clinic if any unusual bleeding or bruising occurs. Pt did express he would prefer to be closer to 2.5, will leave dose the same for now as feel adding 2 mg to his week may put his INR above 3.0. Will continue dosing as outlined. Will follow-up with patient in 2 week(s).        Eda Ugalde, PharmD

## 2018-01-15 ENCOUNTER — ANTICOAGULATION MONITORING (OUTPATIENT)
Dept: VASCULAR LAB | Facility: MEDICAL CENTER | Age: 83
End: 2018-01-15

## 2018-01-15 LAB — INR PPP: 2.4 (ref 2–3.5)

## 2018-01-15 NOTE — PROGRESS NOTES
Anticoagulation Summary  As of 1/15/2018    INR goal:   2.0-3.0   TTR:   80.0 % (2.7 y)   Today's INR:   2.4   Maintenance plan:   2 mg (2 mg x 1) on Mon, Wed; 4 mg (2 mg x 2) all other days   Weekly total:   24 mg   Plan last modified:   Kodak Carr, PharmD (7/17/2017)   Next INR check:   1/29/2018   Priority:   Routine   Target end date:   Indefinite    Indications    Atrial fibrillation [427.31] (Resolved) [I48.91]             Anticoagulation Episode Summary     INR check location:   Home Draw    Preferred lab:       Send INR reminders to:       Comments:   Analilia Oropeza      Anticoagulation Care Providers     Provider Role Specialty Phone number    Bacilio Escalante M.D. Referring Cardiology [phone]    Johnna Johnson, MoiraD           Anticoagulation Patient Findings  Patient Findings     Negatives:   Signs/symptoms of thrombosis, Signs/symptoms of bleeding, Laboratory test error suspected, Change in health, Change in alcohol use, Change in activity, Upcoming invasive procedure, Emergency department visit, Upcoming dental procedure, Missed doses, Extra doses, Change in medications, Change in diet/appetite, Hospital admission, Bruising, Other complaints        Spoke with patient today regarding therapeutic INR of 2.4.  Patient denies any signs/symptoms of bruising or bleeding or any changes in diet and medications.  Instructed patient to call clinic with any questions or concerns.  Pt is to continue with current warfarin dosing regimen.  Follow up in 2 weeks, to reduce risk of adverse events related to this high risk medication,  Warfarin.    Vincenzo Overton, PharmD

## 2018-01-26 ENCOUNTER — TELEPHONE (OUTPATIENT)
Dept: VASCULAR LAB | Facility: MEDICAL CENTER | Age: 83
End: 2018-01-26

## 2018-01-26 NOTE — TELEPHONE ENCOUNTER
[facility]    Pt's wife called stating the pt started drinking Ensure about 4 days ago and want to continue using the product regularly.  Instructed pt to test INR today.    Kodak Carr, MoiraD

## 2018-01-30 ENCOUNTER — ANTICOAGULATION MONITORING (OUTPATIENT)
Dept: VASCULAR LAB | Facility: MEDICAL CENTER | Age: 83
End: 2018-01-30

## 2018-01-30 LAB — INR PPP: 2.2 (ref 2–3.5)

## 2018-01-31 NOTE — PROGRESS NOTES
Anticoagulation Summary  As of 1/30/2018    INR goal:   2.0-3.0   TTR:   80.3 % (2.7 y)   Today's INR:   2.2   Maintenance plan:   2 mg (2 mg x 1) on Mon, Wed; 4 mg (2 mg x 2) all other days   Weekly total:   24 mg   Plan last modified:   Kodak Carr, PharmD (7/17/2017)   Next INR check:   2/13/2018   Priority:   Routine   Target end date:   Indefinite    Indications    Atrial fibrillation [427.31] (Resolved) [I48.91]             Anticoagulation Episode Summary     INR check location:   Home Draw    Preferred lab:       Send INR reminders to:       Comments:   Analilia Oropeaz      Anticoagulation Care Providers     Provider Role Specialty Phone number    Bacilio Escalante M.D. Referring Cardiology [phone]    Johnna Johnson, PharmD           Anticoagulation Patient Findings    Spoke with Gonsalo to report a therapeutic INR of 2.2. Pt is to continue with current warfarin dosing regimen.  Pt denies any unusual s/s of bleeding, bruising, clotting or any changes to diet or medications.  Follow up in 2 weeks, to reduce risk of adverse events related to this high risk medication,  Warfarin.    Ghazal Mckenzie, PharmD

## 2018-01-31 NOTE — PROGRESS NOTES
OP Telephone Anticoagulation Service Note    Date: 1/30/2018      Anticoagulation Summary  As of 1/30/2018    INR goal:   2.0-3.0   TTR:   80.3 % (2.7 y)   Today's INR:   2.2   Maintenance plan:   2 mg (2 mg x 1) on Mon, Wed; 4 mg (2 mg x 2) all other days   Weekly total:   24 mg   Plan last modified:   Kodak Carr, PharmD (7/17/2017)   Next INR check:   2/13/2018   Priority:   Routine   Target end date:   Indefinite    Indications    Atrial fibrillation [427.31] (Resolved) [I48.91]             Anticoagulation Episode Summary     INR check location:   Home Draw    Preferred lab:       Send INR reminders to:       Comments:   Analilia Oropeza      Anticoagulation Care Providers     Provider Role Specialty Phone number    Bacilio Escalante M.D. Referring Cardiology [phone]    Johnna Johnson, MoiraD           Anticoagulation Patient Findings        Plan: Spoke with patient on the phone. Patient is therapeutic today. Confirmed dosing. No missed tablets in the last week. Patient was started on clonidine prn for high blood pressure. Patient denies any signs or symptoms of bleeding or clotting. Instructed patient to call clinic if any unusual bleeding or bruising occurs. Will continue dosing as outlined. Will follow-up with patient in 2 week(s).        Eda Ugalde, MoiraD

## 2018-02-17 LAB — INR PPP: 2 (ref 2–3.5)

## 2018-02-20 ENCOUNTER — ANTICOAGULATION MONITORING (OUTPATIENT)
Dept: VASCULAR LAB | Facility: MEDICAL CENTER | Age: 83
End: 2018-02-20

## 2018-02-21 NOTE — PROGRESS NOTES
OP Anticoagulation Telephone Note    Date: 2/20/2018  Anticoagulation Summary  As of 2/20/2018    INR goal:   2.0-3.0   TTR:   80.7 % (2.8 y)   Today's INR:   2.0 (2/17/2018)   Maintenance plan:   2 mg (2 mg x 1) on Mon, Wed; 4 mg (2 mg x 2) all other days   Weekly total:   24 mg   No change documented:   Patricia Nur, Med Ass't   Plan last modified:   Kodak Carr, PharmD (7/17/2017)   Next INR check:   3/5/2018   Priority:   Routine   Target end date:   Indefinite    Indications    Atrial fibrillation [427.31] (Resolved) [I48.91]             Anticoagulation Episode Summary     INR check location:   Home Draw    Preferred lab:       Send INR reminders to:       Comments:   Analilia Oropeza      Anticoagulation Care Providers     Provider Role Specialty Phone number    Bacilio Escalante M.D. Referring Cardiology [phone]    Johnna Johnson, MoiraD           Anticoagulation Patient Findings  Patient Findings     Negatives:   Signs/symptoms of thrombosis, Signs/symptoms of bleeding, Laboratory test error suspected, Change in health, Change in alcohol use, Change in activity, Upcoming invasive procedure, Emergency department visit, Upcoming dental procedure, Missed doses, Extra doses, Change in medications, Change in diet/appetite, Hospital admission, Bruising, Other complaints      Plan:  Spoke with patient on the phone. Patient is therapeutic today. Patient denies any changes in medications or diet. Patient denies any signs or symptoms of bleeding or clotting. Instructed patient to call clinic if any unusual bleeding or bruising occurs. Will continue dosing as outlined above. Will follow-up with patient in 2 weeks.    Patricia Nur, Medical Assistant      I have reviewed and agree with the plan above on 02/21/2018      Ghazal Mckenzie, PharmD

## 2018-03-03 LAB — INR PPP: 2.2 (ref 2–3.5)

## 2018-03-05 ENCOUNTER — ANTICOAGULATION MONITORING (OUTPATIENT)
Dept: VASCULAR LAB | Facility: MEDICAL CENTER | Age: 83
End: 2018-03-05

## 2018-03-05 NOTE — PROGRESS NOTES
OP Anticoagulation Service Note    Date: 3/5/2018  Anticoagulation Summary  As of 3/5/2018    INR goal:   2.0-3.0   TTR:   80.9 % (2.8 y)   Today's INR:   2.2 (3/3/2018)   Maintenance plan:   2 mg (2 mg x 1) on Mon, Wed; 4 mg (2 mg x 2) all other days   Weekly total:   24 mg   No change documented:   Eliel Rincon, Med Ass't   Plan last modified:   Kodak Carr PharmD (7/17/2017)   Next INR check:   3/16/2018   Priority:   Routine   Target end date:   Indefinite    Indications    Atrial fibrillation [427.31] (Resolved) [I48.91]             Anticoagulation Episode Summary     INR check location:   Home Draw    Preferred lab:       Send INR reminders to:       Comments:   Analilia Oropeza      Anticoagulation Care Providers     Provider Role Specialty Phone number    Bacilio Escalante M.D. Referring Cardiology [phone]    Johnna Johnson PharmD           Anticoagulation Patient Findings  Patient Findings     Negatives:   Signs/symptoms of thrombosis, Signs/symptoms of bleeding, Laboratory test error suspected, Change in health, Change in alcohol use, Change in activity, Upcoming invasive procedure, Emergency department visit, Upcoming dental procedure, Missed doses, Extra doses, Change in medications, Change in diet/appetite, Hospital admission, Bruising, Other complaints        Plan: Left patient a message. Patient is therapeutic and will remain on the same dose. Patient was instructed to call back if needed to report any unusual bleeding or bruising or any changes to medication or diet. Patient is to be checked again in 2 weeks.    Eliel Rincon    I have reviewed and agree with the plan above on 03/05/2018      Ghazal Mckenzie, Michael

## 2018-03-16 ENCOUNTER — ANTICOAGULATION MONITORING (OUTPATIENT)
Dept: VASCULAR LAB | Facility: MEDICAL CENTER | Age: 83
End: 2018-03-16

## 2018-03-16 LAB — INR PPP: 1.8 (ref 2–3.5)

## 2018-03-16 NOTE — PROGRESS NOTES
Anticoagulation Summary  As of 3/16/2018    INR goal:   2.0-3.0   TTR:   80.5 % (2.8 y)   Today's INR:   1.8!   Maintenance plan:   2 mg (2 mg x 1) on Mon, Wed; 4 mg (2 mg x 2) all other days   Weekly total:   24 mg   Plan last modified:   Kodak Carr, PharmD (7/17/2017)   Next INR check:   3/30/2018   Priority:   Routine   Target end date:   Indefinite    Indications    Atrial fibrillation [427.31] (Resolved) [I48.91]             Anticoagulation Episode Summary     INR check location:   Home Draw    Preferred lab:       Send INR reminders to:       Comments:   Analilia Oropeza      Anticoagulation Care Providers     Provider Role Specialty Phone number    Bacilio Escalante M.D. Referring Cardiology [phone]    Johnna Johnson, MoiraD           Anticoagulation Patient Findings    Left voicemail message to report a SUB therapeutic INR of 1.8.  Pt to bolus 6 mg today then continue with current warfarin dosing regimen. Requested pt contact the clinic for any s/s of unusual bleeding, bruising, clotting or any changes to diet or medication. FU 2 weeks.    Kodak Carr, MoiraD

## 2018-03-30 ENCOUNTER — ANTICOAGULATION MONITORING (OUTPATIENT)
Dept: VASCULAR LAB | Facility: MEDICAL CENTER | Age: 83
End: 2018-03-30

## 2018-03-30 LAB — INR PPP: 1.9 (ref 2–3.5)

## 2018-03-30 NOTE — PROGRESS NOTES
Anticoagulation Summary  As of 3/30/2018    INR goal:   2.0-3.0   TTR:   79.5 % (2.9 y)   Today's INR:   1.9!   Maintenance plan:   2 mg (2 mg x 1) on Wed; 4 mg (2 mg x 2) all other days   Weekly total:   26 mg   Plan last modified:   Emiliana Lopez PharmD (3/30/2018)   Next INR check:   4/13/2018   Priority:   Routine   Target end date:   Indefinite    Indications    Atrial fibrillation [427.31] (Resolved) [I48.91]             Anticoagulation Episode Summary     INR check location:   Home Draw    Preferred lab:       Send INR reminders to:       Comments:   Analilia Oropeza      Anticoagulation Care Providers     Provider Role Specialty Phone number    Bacilio Escalante M.D. Referring Cardiology [phone]    Johnna Johnson, MoiraD           Anticoagulation Patient Findings     Spoke to patient on the phone. INR is  Sub-therapeutic at 1.9.Patient denied any signs/symptoms of bleeding or bruising. Patient denied any recent changes to medications or diet. Patient denied any missed doses. Patient was instructed to increase weekly dosing by ~8%.    Follow up in 2 week(s).    Moira Rabago.D

## 2018-04-15 LAB — INR PPP: 2.4 (ref 2–3.5)

## 2018-04-16 ENCOUNTER — ANTICOAGULATION MONITORING (OUTPATIENT)
Dept: VASCULAR LAB | Facility: MEDICAL CENTER | Age: 83
End: 2018-04-16

## 2018-04-16 NOTE — PROGRESS NOTES
OP Anticoagulation Telephone Note    Date: 4/16/2018  Anticoagulation Summary  As of 4/16/2018    INR goal:   2.0-3.0   TTR:   79.5 % (2.9 y)   Today's INR:   2.4 (4/15/2018)   Maintenance plan:   2 mg (2 mg x 1) on Wed; 4 mg (2 mg x 2) all other days   Weekly total:   26 mg   No change documented:   Patricia Nur, Med Ass't   Plan last modified:   Emiliana Lopez PharmD (3/30/2018)   Next INR check:   4/30/2018   Priority:   Routine   Target end date:   Indefinite    Indications    Atrial fibrillation [427.31] (Resolved) [I48.91]             Anticoagulation Episode Summary     INR check location:   Home Draw    Preferred lab:       Send INR reminders to:       Comments:   Analilia Oropeza      Anticoagulation Care Providers     Provider Role Specialty Phone number    Bacilio Escalante M.D. Referring Cardiology [phone]    Moira CalderonD           Anticoagulation Patient Findings  Patient Findings     Negatives:   Signs/symptoms of thrombosis, Signs/symptoms of bleeding, Laboratory test error suspected, Change in health, Change in alcohol use, Change in activity, Upcoming invasive procedure, Emergency department visit, Upcoming dental procedure, Missed doses, Extra doses, Change in medications, Change in diet/appetite, Hospital admission, Bruising, Other complaints      Plan:  Left message on patient's answering machine/ voicemail. Instructed patient to call back with any concerns regarding any unusual bleeding or bruising, any medication or diet changes, or any signs or symptoms of thrombosis. Instructed patient to resume medication as outlined above. Patient to follow up in 2 weeks.     Patricia Nur, Medical Assistant    I have reviewed and am in agreement with the above stated plan on 4-16-18.  Vincenzo Overton, MoiraD

## 2018-05-02 LAB — INR PPP: 2.7 (ref 2–3.5)

## 2018-05-03 ENCOUNTER — ANTICOAGULATION MONITORING (OUTPATIENT)
Dept: VASCULAR LAB | Facility: MEDICAL CENTER | Age: 83
End: 2018-05-03

## 2018-05-03 NOTE — PROGRESS NOTES
Anticoagulation Summary  As of 5/3/2018    INR goal:   2.0-3.0   TTR:   79.8 % (3 y)   Today's INR:   2.7 (5/2/2018)   Warfarin maintenance plan:   2 mg (2 mg x 1) on Wed; 4 mg (2 mg x 2) all other days   Weekly warfarin total:   26 mg   Plan last modified:   Moira SpauldingD (3/30/2018)   Next INR check:   5/16/2018   Priority:   Routine   Target end date:   Indefinite    Indications    Atrial fibrillation [427.31] (Resolved) [I48.91]             Anticoagulation Episode Summary     INR check location:   Home Draw    Preferred lab:       Send INR reminders to:       Comments:   Analilia Oropeza      Anticoagulation Care Providers     Provider Role Specialty Phone number    Bacilio Escalante M.D. Referring Cardiology [phone]    Johnna Johnson, PharmD           Anticoagulation Patient Findings    Left voicemail message to report a therapeutic INR of 2..  Pt to continue with current warfarin dosing regimen. Requested pt contact the clinic for any s/s of unusual bleeding, bruising, clotting or any changes to diet or medication. FU 2 weeks.  Ghazal Mckenzie, PharmD

## 2018-05-15 LAB — INR PPP: 2.6 (ref 2–3.5)

## 2018-05-16 ENCOUNTER — ANTICOAGULATION MONITORING (OUTPATIENT)
Dept: VASCULAR LAB | Facility: MEDICAL CENTER | Age: 83
End: 2018-05-16

## 2018-05-16 NOTE — PROGRESS NOTES
OP Anticoagulation Telephone Note    Date: 5/16/2018  Anticoagulation Summary  As of 5/16/2018    INR goal:   2.0-3.0   TTR:   80.0 % (3 y)   Today's INR:   2.6 (5/15/2018)   Warfarin maintenance plan:   2 mg (2 mg x 1) on Wed; 4 mg (2 mg x 2) all other days   Weekly warfarin total:   26 mg   No change documented:   Patricia Nur, Med Ass't   Plan last modified:   Emiliana Lopez PharmD (3/30/2018)   Next INR check:   5/30/2018   Priority:   Routine   Target end date:   Indefinite    Indications    Atrial fibrillation [427.31] (Resolved) [I48.91]             Anticoagulation Episode Summary     INR check location:   Home Draw    Preferred lab:       Send INR reminders to:       Comments:   Analilia Oropeza      Anticoagulation Care Providers     Provider Role Specialty Phone number    Bacilio Escalante M.D. Referring Cardiology [phone]    Moira CalderonD           Anticoagulation Patient Findings  Patient Findings     Negatives:   Signs/symptoms of thrombosis, Signs/symptoms of bleeding, Laboratory test error suspected, Change in health, Change in alcohol use, Change in activity, Upcoming invasive procedure, Emergency department visit, Upcoming dental procedure, Missed doses, Extra doses, Change in medications, Change in diet/appetite, Hospital admission, Bruising, Other complaints      Plan:  Left message on patient's answering machine/ voicemail. Instructed patient to call back with any concerns regarding any unusual bleeding or bruising, any medication or diet changes, or any signs or symptoms of thrombosis. Instructed patient to resume medication as outlined above. Patient to follow up in 2 weeks.     Patricia Nur, Medical Assistant    I have reviewed and am in agreement with the above stated plan on 5-16-18.  Vincenzo Overton, MoiraD

## 2018-05-31 ENCOUNTER — ANTICOAGULATION MONITORING (OUTPATIENT)
Dept: VASCULAR LAB | Facility: MEDICAL CENTER | Age: 83
End: 2018-05-31

## 2018-05-31 LAB — INR PPP: 2.3 (ref 2–3.5)

## 2018-05-31 NOTE — PROGRESS NOTES
OP Anticoagulation Telephone Note    Date: 5/31/2018       Plan:  Spoke with pt's wife on the phone. Pt is therapeutic today. Denies any changes in medications or diet. Denies any s/sx of bleeding or clotting. Will continue warfarin dosing as outlined below and follow-up with pt in 4wks.      Anticoagulation Summary  As of 5/31/2018    INR goal:   2.0-3.0   TTR:   80.3 % (3 y)   Today's INR:   2.3   Warfarin maintenance plan:   2 mg (2 mg x 1) on Wed; 4 mg (2 mg x 2) all other days   Weekly warfarin total:   26 mg   Plan last modified:   Emiliana Lopez, PharmD (3/30/2018)   Next INR check:   6/28/2018   Priority:   Routine   Target end date:   Indefinite    Indications    Atrial fibrillation [427.31] (Resolved) [I48.91]             Anticoagulation Episode Summary     INR check location:   Home Draw    Preferred lab:       Send INR reminders to:       Comments:   Analilia Oropeza      Anticoagulation Care Providers     Provider Role Specialty Phone number    Bacilio Escalante M.D. Referring Cardiology [phone]    Johnna Johnson, PharmD                 ISAIAH Carlson  [facility]

## 2018-06-16 LAB — INR PPP: 1.9 (ref 2–3.5)

## 2018-06-18 ENCOUNTER — ANTICOAGULATION MONITORING (OUTPATIENT)
Dept: VASCULAR LAB | Facility: MEDICAL CENTER | Age: 83
End: 2018-06-18

## 2018-06-18 NOTE — PROGRESS NOTES
OP Telephone Anticoagulation Service Note    Date: 6/18/2018      Anticoagulation Summary  As of 6/18/2018    INR goal:   2.0-3.0   TTR:   80.2 % (3.1 y)   Today's INR:   1.9! (6/16/2018)   Warfarin maintenance plan:   2 mg (2 mg x 1) on Wed; 4 mg (2 mg x 2) all other days   Weekly warfarin total:   26 mg   Plan last modified:   Moira SpauldingD (3/30/2018)   Next INR check:   6/29/2018   Priority:   Routine   Target end date:   Indefinite    Indications    Atrial fibrillation [427.31] (Resolved) [I48.91]             Anticoagulation Episode Summary     INR check location:   Home Draw    Preferred lab:       Send INR reminders to:       Comments:   Analilia Oropeza      Anticoagulation Care Providers     Provider Role Specialty Phone number    Bacilio Escalante M.D. Referring Cardiology [phone]    Johnna Johnson, MoiraD           Anticoagulation Patient Findings      Plan: Sub-therapeutic INR 1.9 on 6/16/2018.     Spoke with patient on the phone about today's sub-therapeutic INR. Confirmed dosing. No missed tablets in the last week. Patient denies any changes in medications or diet, patient states he ate more salad last week and he would like to eat more salads in the future. Patient denies any signs or symptoms of bleeding or clotting. Instructed patient to call clinic if any unusual bleeding or bruising occurs. Per Eda, instructed patient to take 2.5 tablets (5 mg) today then continue with normal weekly dose as outlined above. Instructed him to increase his salads intake to where he would like it to be and we will make adjustments to his Warfarin dose the next time he checks his INR to compensate for increased greens. Will follow-up with patient in 2 week(s), June 29, 2018.       Radha Wang, Pharmacy Intern    I have reviewed and am in agreement with the above stated plan on 6-18-18.  Vincenzo Overton, PharmD

## 2018-07-01 LAB — INR PPP: 2.6 (ref 2–3.5)

## 2018-07-02 ENCOUNTER — ANTICOAGULATION MONITORING (OUTPATIENT)
Dept: VASCULAR LAB | Facility: MEDICAL CENTER | Age: 83
End: 2018-07-02

## 2018-07-02 NOTE — PROGRESS NOTES
OP Telephone Anticoagulation Service Note    Date: 7/2/2018      Anticoagulation Summary  As of 7/2/2018    INR goal:   2.0-3.0   TTR:   80.3 % (3.1 y)   Today's INR:   2.6 (7/1/2018)   Warfarin maintenance plan:   2 mg (2 mg x 1) on Wed; 4 mg (2 mg x 2) all other days   Weekly warfarin total:   26 mg   Plan last modified:   Moira SpauldingD (3/30/2018)   Next INR check:   7/16/2018   Priority:   Routine   Target end date:   Indefinite    Indications    Atrial fibrillation [427.31] (Resolved) [I48.91]             Anticoagulation Episode Summary     INR check location:   Home Draw    Preferred lab:       Send INR reminders to:       Comments:   Analilia Oropeza      Anticoagulation Care Providers     Provider Role Specialty Phone number    Bacilio Escalante M.D. Referring Cardiology [phone]    Johnna Johnson, MoiraD           Anticoagulation Patient Findings        Plan: Spoke with patient and wife on the phone. Patient is therapeutic today with INR of 2.6. Confirmed dosing of 2 mg Wednesday, and 4 mg all other days of the week. No missed tablets in the last week. Patient denies any changes in medications or diet. Patient denies any signs or symptoms of bleeding or clotting. Instructed patient to call clinic if any unusual bleeding or bruising occurs. Will continue dosing as outlined. Will follow-up with patient in 2 weeks.      Teri Del Cid, Pharmacy Intern      I have reviewed and am in agreement with the above stated plan on 7-2-18.  Vincenzo Overton, MoiraD

## 2018-07-15 LAB — INR PPP: 2.5 (ref 2–3.5)

## 2018-07-16 ENCOUNTER — ANTICOAGULATION MONITORING (OUTPATIENT)
Dept: VASCULAR LAB | Facility: MEDICAL CENTER | Age: 83
End: 2018-07-16

## 2018-07-16 NOTE — PROGRESS NOTES
OP Anticoagulation Telephone Note    Date: 7/16/2018  Anticoagulation Summary  As of 7/16/2018    INR goal:   2.0-3.0   TTR:   80.5 % (3.2 y)   Today's INR:   2.5 (7/15/2018)   Warfarin maintenance plan:   2 mg (2 mg x 1) on Wed; 4 mg (2 mg x 2) all other days   Weekly warfarin total:   26 mg   No change documented:   Patricia Nur, Med Ass't   Plan last modified:   Emiliana Lopez PharmD (3/30/2018)   Next INR check:   7/30/2018   Priority:   Routine   Target end date:   Indefinite    Indications    Atrial fibrillation [427.31] (Resolved) [I48.91]             Anticoagulation Episode Summary     INR check location:   Home Draw    Preferred lab:       Send INR reminders to:       Comments:   Analilia Oropeza      Anticoagulation Care Providers     Provider Role Specialty Phone number    Bacilio Escalante M.D. Referring Cardiology [phone]    Moira CalderonD           Anticoagulation Patient Findings  Patient Findings     Negatives:   Signs/symptoms of thrombosis, Signs/symptoms of bleeding, Laboratory test error suspected, Change in health, Change in alcohol use, Change in activity, Upcoming invasive procedure, Emergency department visit, Upcoming dental procedure, Missed doses, Extra doses, Change in medications, Change in diet/appetite, Hospital admission, Bruising, Other complaints      Plan:  Left message on patient's answering machine/ voicemail. Instructed patient to call back with any concerns regarding any unusual bleeding or bruising, any medication or diet changes, or any signs or symptoms of thrombosis. Instructed patient to resume medication as outlined above. Patient to follow up in 2 weeks.     Patricia Nur, Medical Assistant    I have reviewed and concur with the above plan     Oseas Pena, PharmD

## 2018-07-30 ENCOUNTER — ANTICOAGULATION MONITORING (OUTPATIENT)
Dept: VASCULAR LAB | Facility: MEDICAL CENTER | Age: 83
End: 2018-07-30

## 2018-07-30 LAB — INR PPP: 3.3 (ref 2–3.5)

## 2018-07-30 NOTE — PROGRESS NOTES
OP Telephone Anticoagulation Service Note    Date: 7/30/2018      Anticoagulation Summary  As of 7/30/2018    INR goal:   2.0-3.0   TTR:   80.3 % (3.2 y)   Today's INR:   3.3!   Warfarin maintenance plan:   2 mg (2 mg x 1) on Wed; 4 mg (2 mg x 2) all other days   Weekly warfarin total:   26 mg   Plan last modified:   Moira SpauldingD (3/30/2018)   Next INR check:   8/13/2018   Priority:   Routine   Target end date:   Indefinite    Indications    Atrial fibrillation [427.31] (Resolved) [I48.91]             Anticoagulation Episode Summary     INR check location:   Home Draw    Preferred lab:       Send INR reminders to:       Comments:   Analilia Oropeza      Anticoagulation Care Providers     Provider Role Specialty Phone number    Bacilio Escalante M.D. Referring Cardiology [phone]    Johnna Johnson, PharmD           Anticoagulation Patient Findings        Plan: Spoke with patient on the phone. Patient is supra-therapeutic today. Confirmed dosing of 2 mg on Wed; 4 mg ROW. No missed tablets in the last week. Patient denies any changes in medications or diet. Patient doesn't drink alcohol and doesn't use protein shakes. Patient denies any signs or symptoms of bleeding or clotting. Instructed patient to call clinic if any unusual bleeding or bruising occurs. Recommended patient decrease dose to 2 mg today, then to resume his normal regimen. Will follow-up with patient in 2 weeks.      Teri Del Cid, Pharmacy Intern    07/30/18  [place] - Johnna Johnson, PharmD

## 2018-08-29 ENCOUNTER — ANTICOAGULATION MONITORING (OUTPATIENT)
Dept: VASCULAR LAB | Facility: MEDICAL CENTER | Age: 83
End: 2018-08-29

## 2018-08-29 LAB — INR PPP: 4 (ref 2–3.5)

## 2018-08-29 NOTE — PROGRESS NOTES
Anticoagulation Summary  As of 8/29/2018    INR goal:   2.0-3.0   TTR:   78.3 % (3.3 y)   Today's INR:   4.0!   Warfarin maintenance plan:   2 mg (2 mg x 1) on Wed, Fri; 4 mg (2 mg x 2) all other days   Weekly warfarin total:   24 mg   Plan last modified:   Johnna Johnson PharmD (8/29/2018)   Next INR check:   9/12/2018   Priority:   Routine   Target end date:   Indefinite    Indications    Atrial fibrillation [427.31] (Resolved) [I48.91]             Anticoagulation Episode Summary     INR check location:   Home Draw    Preferred lab:       Send INR reminders to:       Comments:   Analilia       Anticoagulation Care Providers     Provider Role Specialty Phone number    Bacilio Escalante M.D. Referring Cardiology [phone]    Johnna Johnson PharmD       [facility] Responsible  [phone]        Anticoagulation Patient Findings      Spoke with patient.  INR is SUPRA therapeutic.   Pt was out of town, he wasn't eating his same diet as he's been out of town.  Pt denies any unusual s/s of bleeding, bruising, clotting or any changes to diet or medications. Denies any etoh, cranberries, supplements, or illness.   Pt verifies warfarin weekly dosing.     Will have pt HOLD his warfarin today and then tomorrow take a reduced dose of 2mg x1 tomorrow. On Friday pt to resume his normal dose.      Repeat INR in 2 week(s).     Johnna Johnson PharmD

## 2018-09-15 LAB — INR PPP: 2.3 (ref 2–3.5)

## 2018-09-17 ENCOUNTER — ANTICOAGULATION MONITORING (OUTPATIENT)
Dept: VASCULAR LAB | Facility: MEDICAL CENTER | Age: 83
End: 2018-09-17

## 2018-09-18 NOTE — PROGRESS NOTES
Anticoagulation Summary  As of 9/17/2018    INR goal:   2.0-3.0   TTR:   77.8 % (3.3 y)   Today's INR:   2.3 (9/15/2018)   Warfarin maintenance plan:   2 mg (2 mg x 1) on Wed; 4 mg (2 mg x 2) all other days   Weekly warfarin total:   26 mg   Plan last modified:   Johnna Johnson PharmD (8/29/2018)   Next INR check:   9/28/2018   Priority:   Routine   Target end date:   Indefinite    Indications    Atrial fibrillation [427.31] (Resolved) [I48.91]             Anticoagulation Episode Summary     INR check location:   Home Draw    Preferred lab:       Send INR reminders to:       Comments:   [facility]       Anticoagulation Care Providers     Provider Role Specialty Phone number    Bacilio Escalante M.D. Referring Cardiology [phone]    Johnna Johnson PharmD       [facility] Responsible  [phone]        Anticoagulation Patient Findings  Patient Findings     Negatives:   Signs/symptoms of thrombosis, Signs/symptoms of bleeding, Laboratory test error suspected, Change in health, Change in alcohol use, Change in activity, Upcoming invasive procedure, Emergency department visit, Upcoming dental procedure, Missed doses, Extra doses, Change in medications, Change in diet/appetite, Hospital admission, Bruising, Other complaints        Spoke with patient today regarding therapeutic INR of 2.3.  Patient denies any signs/symptoms of bruising or bleeding or any changes in diet and medications.  Instructed patient to call clinic with any questions or concerns.  Pt is to continue with current warfarin dosing regimen.  Follow up in 2 weeks, to reduce risk of adverse events related to this high risk medication,  Warfarin.    Vincenzo Overton, MoiraD

## 2018-10-01 LAB — INR PPP: 2.6 (ref 2–3.5)

## 2018-10-02 ENCOUNTER — ANTICOAGULATION MONITORING (OUTPATIENT)
Dept: VASCULAR LAB | Facility: MEDICAL CENTER | Age: 83
End: 2018-10-02

## 2018-10-02 NOTE — PROGRESS NOTES
OP Anticoagulation Telephone Note    Date: 10/2/2018  Anticoagulation Summary  As of 10/2/2018    INR goal:   2.0-3.0   TTR:   78.1 % (3.4 y)   Today's INR:   2.6 (10/1/2018)   Warfarin maintenance plan:   2 mg (2 mg x 1) on Wed; 4 mg (2 mg x 2) all other days   Weekly warfarin total:   26 mg   No change documented:   Patricia Nur, Med Ass't   Plan last modified:   Johnna Johnson, PharmD (8/29/2018)   Next INR check:   10/15/2018   Priority:   Routine   Target end date:   Indefinite    Indications    Atrial fibrillation [427.31] (Resolved) [I48.91]             Anticoagulation Episode Summary     INR check location:   Home Draw    Preferred lab:       Send INR reminders to:       Comments:   Analilia       Anticoagulation Care Providers     Provider Role Specialty Phone number    Bacilio Escalante M.D. Referring Cardiology [phone]    Johnna Johnson, PharmD       [facility] Responsible  [phone]        Anticoagulation Patient Findings  Patient Findings     Negatives:   Signs/symptoms of thrombosis, Signs/symptoms of bleeding, Laboratory test error suspected, Change in health, Change in alcohol use, Change in activity, Upcoming invasive procedure, Emergency department visit, Upcoming dental procedure, Missed doses, Extra doses, Change in medications, Change in diet/appetite, Hospital admission, Bruising, Other complaints      Plan:  Spoke with patient on the phone. Patient is therapeutic today. Patient denies any changes in medications or diet. Patient denies any signs or symptoms of bleeding or clotting. Instructed patient to call clinic if any unusual bleeding or bruising occurs. Will continue dosing as outlined above. Will follow-up with patient in 2 weeks.    Patricia Nur, Medical Assistant    I have reviewed and concur with the above plan on 10/02/2018.  Ghazal Mckenzie, Clinical Pharmacist

## 2018-10-17 ENCOUNTER — ANTICOAGULATION MONITORING (OUTPATIENT)
Dept: VASCULAR LAB | Facility: MEDICAL CENTER | Age: 83
End: 2018-10-17

## 2018-10-17 LAB — INR PPP: 4.5 (ref 2–3.5)

## 2018-10-17 NOTE — PROGRESS NOTES
Anticoagulation Summary  As of 10/17/2018    INR goal:   2.0-3.0   TTR:   77.3 % (3.4 y)   Today's INR:   4.5!   Warfarin maintenance plan:   2 mg (2 mg x 1) on Wed; 4 mg (2 mg x 2) all other days   Weekly warfarin total:   26 mg   Plan last modified:   Johnna Johnson PharmD (8/29/2018)   Next INR check:   10/19/2018   Priority:   Routine   Target end date:   Indefinite    Indications    Atrial fibrillation [427.31] (Resolved) [I48.91]             Anticoagulation Episode Summary     INR check location:   Home Draw    Preferred lab:       Send INR reminders to:       Comments:   [facility] - [phone]      Anticoagulation Care Providers     Provider Role Specialty Phone number    Bacilio Escalante M.D. Referring Cardiology [phone]    Johnna Johnson, Michael       [facility] Responsible  [phone]        Anticoagulation Patient Findings      Spoke with wife.  INR is SUPRA therapeutic.   Wife and patient are very concerned regarding the INR as they were informed by Analilia that the testing strips aren't valid for results > 4.5, as such they are going tomorrow for a confirmatory INR draw at the lab, per their preference.    So will have pt HOLD warfarin dose today, and then tomorrow plan to take a reduced dose of 2mg x1 unless we instruct otherwise with lab results.     Repeat INR in 1 day.     Johnna Johnson, Michael

## 2018-10-19 ENCOUNTER — ANTICOAGULATION MONITORING (OUTPATIENT)
Dept: VASCULAR LAB | Facility: MEDICAL CENTER | Age: 83
End: 2018-10-19

## 2018-10-19 NOTE — PROGRESS NOTES
Anticoagulation Summary  As of 10/19/2018    INR goal:   2.0-3.0   TTR:   77.3 % (3.4 y)   Today's INR:   3.34! (10/18/2018)   Warfarin maintenance plan:   2 mg (2 mg x 1) on Wed; 4 mg (2 mg x 2) all other days   Weekly warfarin total:   26 mg   Plan last modified:   Johnna Johnson PharmD (8/29/2018)   Next INR check:   10/24/2018   Priority:   Routine   Target end date:   Indefinite    Indications    Atrial fibrillation [427.31] (Resolved) [I48.91]             Anticoagulation Episode Summary     INR check location:   Home Draw    Preferred lab:       Send INR reminders to:       Comments:   [facility] - [phone]      Anticoagulation Care Providers     Provider Role Specialty Phone number    Bacilio Escalante M.D. Referring Cardiology [phone]    Johnna Johnson, Michael       [facility] Responsible  [phone]        Anticoagulation Patient Findings  Patient Findings     Negatives:   Signs/symptoms of thrombosis, Signs/symptoms of bleeding, Laboratory test error suspected, Change in health, Change in alcohol use, Change in activity, Upcoming invasive procedure, Emergency department visit, Upcoming dental procedure, Missed doses, Extra doses, Change in medications, Change in diet/appetite, Hospital admission, Bruising, Other complaints        Spoke with the patient on the phone today, reporting a SUPRA-therapeutic INR of 3.34. Patient went to lab to confirm elevated INR from  on Wednesday. Confirmed the current warfarin dosing regimen and patient compliance. Patient denies any interval changes to diet and/or medications. Patient denies any signs/symptoms of bleeding or clotting.  Patient has already HELD dose for 2 days. Will have patient continue with the current dosing regimen and will follow up again in 1 week.    Gino Ngo PharmD

## 2018-10-25 ENCOUNTER — ANTICOAGULATION MONITORING (OUTPATIENT)
Dept: VASCULAR LAB | Facility: MEDICAL CENTER | Age: 83
End: 2018-10-25

## 2018-10-25 LAB — INR PPP: 2 (ref 2–3.5)

## 2018-10-25 NOTE — PROGRESS NOTES
Anticoagulation Summary  As of 10/25/2018    INR goal:   2.0-3.0   TTR:   77.2 % (3.4 y)   Today's INR:   2.0   Warfarin maintenance plan:   2 mg (2 mg x 1) on Wed; 4 mg (2 mg x 2) all other days   Weekly warfarin total:   26 mg   Plan last modified:   Johnna Johnson PharmD (8/29/2018)   Next INR check:   11/15/2018   Priority:   Routine   Target end date:   Indefinite    Indications    Atrial fibrillation [427.31] (Resolved) [I48.91]             Anticoagulation Episode Summary     INR check location:   Home Draw    Preferred lab:       Send INR reminders to:       Comments:   [facility] - [phone]      Anticoagulation Care Providers     Provider Role Specialty Phone number    Bacilio Escalante M.D. Referring Cardiology [phone]    Johnna Johnson, Michael       [facility]ticoagulation [facility] Responsible  [phone]        Anticoagulation Patient Findings  Patient Findings     Negatives:   Signs/symptoms of thrombosis, Signs/symptoms of bleeding, Laboratory test error suspected, Change in health, Change in alcohol use, Change in activity, Upcoming invasive procedure, Emergency department visit, Upcoming dental procedure, Missed doses, Extra doses, Change in medications, Change in diet/appetite, Hospital admission, Bruising, Other complaints        Spoke with the patient on the phone today, reporting a therapeutic INR of 2.0.  Confirmed the current warfarin dosing regimen and patient compliance. Patient denies any interval changes to diet and/or medications. Patient denies any signs/symptoms of bleeding or clotting.  Patient instructed to continue with the current warfarin dosing regimen, and asked to follow up again in 3 weeks (per pt preference).    Gino PizarroD

## 2018-11-01 LAB — INR PPP: 2.7 (ref 2–3.5)

## 2018-11-02 ENCOUNTER — ANTICOAGULATION MONITORING (OUTPATIENT)
Dept: VASCULAR LAB | Facility: MEDICAL CENTER | Age: 83
End: 2018-11-02

## 2018-11-02 NOTE — PROGRESS NOTES
Anticoagulation Summary  As of 11/2/2018    INR goal:   2.0-3.0   TTR:   77.4 % (3.5 y)   Today's INR:   2.7 (11/1/2018)   Warfarin maintenance plan:   2 mg (2 mg x 1) on Wed; 4 mg (2 mg x 2) all other days   Weekly warfarin total:   26 mg   Plan last modified:   Johnna Johnson, PharmD (8/29/2018)   Next INR check:   11/15/2018   Priority:   Routine   Target end date:   Indefinite    Indications    Atrial fibrillation [427.31] (Resolved) [I48.91]             Anticoagulation Episode Summary     INR check location:   Home Draw    Preferred lab:       Send INR reminders to:       Comments:   [facility] - [phone]      Anticoagulation Care Providers     Provider Role Specialty Phone number    Bacilio Escalante M.D. Referring Cardiology [phone]    Johnna Johnson, PharmD       [facility] Responsible  [phone]        Anticoagulation Patient Findings    Spoke with Gonsalo, to report a therapeutic INR of 2.7 . Continue current dosing regimen.  Follow up in 2 weeks, to reduce the risk of adverse events related to this high risk medication, warfarin.    Ghazal Mckenzie, Clinical Pharmacist

## 2018-11-15 ENCOUNTER — ANTICOAGULATION MONITORING (OUTPATIENT)
Dept: VASCULAR LAB | Facility: MEDICAL CENTER | Age: 83
End: 2018-11-15

## 2018-11-15 LAB — INR PPP: 2.3 (ref 2–3.5)

## 2018-11-16 NOTE — PROGRESS NOTES
Anticoagulation Summary  As of 11/15/2018    INR goal:   2.0-3.0   TTR:   77.6 % (3.5 y)   Today's INR:   2.3   Warfarin maintenance plan:   2 mg (2 mg x 1) on Wed; 4 mg (2 mg x 2) all other days   Weekly warfarin total:   26 mg   Plan last modified:   Johnna Johnson, PharmD (8/29/2018)   Next INR check:      Priority:   Routine   Target end date:   Indefinite    Indications    Atrial fibrillation [427.31] (Resolved) [I48.91]             Anticoagulation Episode Summary     INR check location:   Home Draw    Preferred lab:       Send INR reminders to:       Comments:   [facility] - [phone]      Anticoagulation Care Providers     Provider Role Specialty Phone number    Bacilio Escalante M.D. Referring Cardiology [phone]    Johnna Johnson, PharmD       [facility] Anticoagulation Services Responsible  [phone]        Anticoagulation Patient Findings        Spoke to patient on the phone.   INR  -therapeutic.   Denies signs/symptoms of bleeding and/or thrombosis.   Denies changes to diet or medications.   Follow up appointment in 2 week(s).    Continue weekly warfarin dose as noted      Oseas Pena, PharmD

## 2018-12-02 LAB — INR PPP: 2.5 (ref 2–3.5)

## 2018-12-03 ENCOUNTER — ANTICOAGULATION MONITORING (OUTPATIENT)
Dept: VASCULAR LAB | Facility: MEDICAL CENTER | Age: 83
End: 2018-12-03

## 2018-12-17 ENCOUNTER — ANTICOAGULATION MONITORING (OUTPATIENT)
Dept: VASCULAR LAB | Facility: MEDICAL CENTER | Age: 83
End: 2018-12-17

## 2018-12-17 LAB — INR PPP: 2.4 (ref 2–3.5)

## 2018-12-17 NOTE — PROGRESS NOTES
Anticoagulation Summary  As of 12/17/2018    INR goal:   2.0-3.0   TTR:   78.2 % (3.6 y)   Today's INR:   2.4   Warfarin maintenance plan:   2 mg (2 mg x 1) on Wed; 4 mg (2 mg x 2) all other days   Weekly warfarin total:   26 mg   Plan last modified:   Johnna Johnson, PharmD (8/29/2018)   Next INR check:   12/31/2018   Priority:   Routine   Target end date:   Indefinite    Indications    Atrial fibrillation [427.31] (Resolved) [I48.91]             Anticoagulation Episode Summary     INR check location:   Home Draw    Preferred lab:       Send INR reminders to:       Comments:   [facility] - [phone]      Anticoagulation Care Providers     Provider Role Specialty Phone number    Bacilio Escalante M.D. Referring Cardiology [phone]    Johnna Johnson, PharmD       [facility] Anticoagulation Services Responsible  [phone]        Anticoagulation Patient Findings        Tried calling patient but had to leave a message.  INR is therapeutic.   Pt was asked to call us if they have had any s/s of bleeding, or if they have had any medication changes.    Verified current warfarin regimen in the message.   Notes from 12/10 show that pt was perscribed a course of cephalexin.    Plan: Continue current warfarin regimen as outlined above.       Repeat INR 2 weeks.    Juan Staton 2019 PharmD Candidate    I have reviewed and concur with the above plan on 12/17/2018.  Ghazal Mckenzie, Clinical Pharmacist

## 2019-01-02 ENCOUNTER — ANTICOAGULATION MONITORING (OUTPATIENT)
Dept: VASCULAR LAB | Facility: MEDICAL CENTER | Age: 84
End: 2019-01-02

## 2019-01-02 LAB — INR PPP: 2.8 (ref 2–3.5)

## 2019-01-03 NOTE — PROGRESS NOTES
Anticoagulation Summary  As of 1/2/2019    INR goal:   2.0-3.0   TTR:   78.4 % (3.6 y)   Today's INR:   2.8   Warfarin maintenance plan:   2 mg (2 mg x 1) on Wed; 4 mg (2 mg x 2) all other days   Weekly warfarin total:   26 mg   Plan last modified:   Johnna Johnson, Michael (8/29/2018)   Next INR check:   1/16/2019   Priority:   Routine   Target end date:   Indefinite    Indications    Atrial fibrillation [427.31] (Resolved) [I48.91]             Anticoagulation Episode Summary     INR check location:   Home Draw    Preferred lab:       Send INR reminders to:       Comments:   [facility] - [phone]      Anticoagulation Care Providers     Provider Role Specialty Phone number    Bacilio Escalante M.D. Referring Cardiology [phone]    Johnna Johnson, PharmMAILE       [facility] Anticoagulation Services Responsible  [phone]        Anticoagulation Patient Findings    Left voicemail message to report a therapeutic INR of 2.8.  Pt to continue with current warfarin dosing regimen. Requested pt contact the clinic for any s/s of unusual bleeding, bruising, clotting or any changes to diet or medication. FU 2 weeks.  Vincenzo Overton, MoiraD

## 2019-01-15 LAB — INR PPP: 3.1 (ref 2–3.5)

## 2019-01-21 ENCOUNTER — ANTICOAGULATION MONITORING (OUTPATIENT)
Dept: VASCULAR LAB | Facility: MEDICAL CENTER | Age: 84
End: 2019-01-21

## 2019-01-21 NOTE — PROGRESS NOTES
Anticoagulation Summary  As of 1/21/2019    INR goal:   2.0-3.0   TTR:   78.3 % (3.7 y)   Today's INR:   3.1! (1/15/2019)   Warfarin maintenance plan:   2 mg (2 mg x 1) on Wed; 4 mg (2 mg x 2) all other days   Weekly warfarin total:   26 mg   Plan last modified:   Johnna Johnson PharmD (8/29/2018)   Next INR check:   1/28/2019   Priority:   Routine   Target end date:   Indefinite    Indications    Atrial fibrillation [427.31] (Resolved) [I48.91]             Anticoagulation Episode Summary     INR check location:   Home Draw    Preferred lab:       Send INR reminders to:       Comments:   [facility] [phone]      Anticoagulation Care Providers     Provider Role Specialty Phone number    Bacilio Escalante M.D. Referring Cardiology [phone]    Johnna Johnson, Michael       [facility] Anticoagulation Services Responsible  [phone]        Anticoagulation Patient Findings    INR  supra-therapeutic.   Left a voice message for the patient, asked patient to please call the anticoagulation clinic if they have any signs/symptoms of bleeding and/or thrombosis or any changes to diet or medications.    Follow up appointment in 2 week(s)    2mg today then continue weekly warfarin dose as noted    Oseas Pena, PharmD

## 2019-01-30 ENCOUNTER — ANTICOAGULATION MONITORING (OUTPATIENT)
Dept: VASCULAR LAB | Facility: MEDICAL CENTER | Age: 84
End: 2019-01-30

## 2019-01-30 LAB — INR PPP: 3.3 (ref 2–3.5)

## 2019-01-30 NOTE — PROGRESS NOTES
Anticoagulation Summary  As of 1/30/2019    INR goal:   2.0-3.0   TTR:   77.4 % (3.7 y)   INR used for dosing:   3.3! (1/30/2019)   Warfarin maintenance plan:   2 mg (2 mg x 1) every Wed; 4 mg (2 mg x 2) all other days   Weekly warfarin total:   26 mg   Plan last modified:   Johnna Johnson, PharmD (8/29/2018)   Next INR check:   2/6/2019   Priority:   Routine   Target end date:   Indefinite    Indications    Atrial fibrillation [427.31] (Resolved) [I48.91]             Anticoagulation Episode Summary     INR check location:   Home Draw    Preferred lab:       Send INR reminders to:       Comments:   [facility] [phone]      Anticoagulation Care Providers     Provider Role Specialty Phone number    Bacilio Escalante M.D. Referring Cardiology [phone]    Johnna Johnson, PharmD       [facility] Anticoagulation Services Responsible  [phone]        Anticoagulation Patient Findings      Left message for patient.  INR is supra therapeutic.   Pt instructed to report any unusual s/s of bleeding, bruising, clotting or any changes to diet or medications.  Verified warfarin weekly dosing.   CHADSVASC = 4  Clot hx none    Will have pt hold tonight's dose then return to the normal weekly warfarin regimen as outlined above.     Repeat INR in 1 week(s).     Discussed with [facility] Johnna Ibarra, Pharmacy Intern

## 2019-02-08 ENCOUNTER — ANTICOAGULATION MONITORING (OUTPATIENT)
Dept: VASCULAR LAB | Facility: MEDICAL CENTER | Age: 84
End: 2019-02-08

## 2019-02-08 LAB — INR PPP: 2.8 (ref 2–3.5)

## 2019-02-09 NOTE — PROGRESS NOTES
Anticoagulation Summary  As of 2019    INR goal:   2.0-3.0   TTR:   77.2 % (3.7 y)   INR used for dosin.8 (2019)   Warfarin maintenance plan:   2 mg (2 mg x 1) every Wed; 4 mg (2 mg x 2) all other days   Weekly warfarin total:   26 mg   No change documented:   Liv Ritchie, Med Ass't   Plan last modified:   Johnna Johnson, Michael (2018)   Next INR check:   2/15/2019   Priority:   Routine   Target end date:   Indefinite    Indications    Atrial fibrillation [427.31] (Resolved) [I48.91]             Anticoagulation Episode Summary     INR check location:   Home Draw    Preferred lab:       Send INR reminders to:       Comments:   [facility] [phone]      Anticoagulation Care Providers     Provider Role Specialty Phone number    Bacilio Escalante M.D. Referring Cardiology [phone]    Johnna Johnson, PharmD       [facility]ticoagulation [facility] Responsible  [phone]        Anticoagulation Patient Findings  Patient Findings     Negatives:   Signs/symptoms of thrombosis, Signs/symptoms of bleeding, Laboratory test error suspected, Change in health, Change in alcohol use, Change in activity, Upcoming invasive procedure, Emergency department visit, Upcoming dental procedure, Missed doses, Extra doses, Change in medications, Change in diet/appetite, Hospital admission, Bruising, Other complaints        Spoke with patient to report a therapeutic INR.  Pt instructed to continue with current warfarin dosing regimen. Pt denies any s/s of bleeding, bruising, clotting or any changes to diet or medication.  Will follow up in 1 week.    Liv Ritchie, Med Ass't     19  Cosignature - Johnna Johnson, PharmD

## 2019-02-16 LAB — INR PPP: 2.2 (ref 2–3.5)

## 2019-02-19 ENCOUNTER — ANTICOAGULATION MONITORING (OUTPATIENT)
Dept: VASCULAR LAB | Facility: MEDICAL CENTER | Age: 84
End: 2019-02-19

## 2019-03-01 ENCOUNTER — ANTICOAGULATION MONITORING (OUTPATIENT)
Dept: VASCULAR LAB | Facility: MEDICAL CENTER | Age: 84
End: 2019-03-01

## 2019-03-01 LAB — INR PPP: 1.8 (ref 2–3.5)

## 2019-03-02 NOTE — PROGRESS NOTES
Anticoagulation Summary  As of 3/1/2019    INR goal:   2.0-3.0   TTR:   77.1 % (3.8 y)   INR used for dosin.8! (3/1/2019)   Warfarin maintenance plan:   2 mg (2 mg x 1) every Wed; 4 mg (2 mg x 2) all other days   Weekly warfarin total:   26 mg   Plan last modified:   Johnna Johnson, Michael (2018)   Next INR check:   3/15/2019   Priority:   Routine   Target end date:   Indefinite    Indications    Atrial fibrillation [427.31] (Resolved) [I48.91]             Anticoagulation Episode Summary     INR check location:   Home Draw    Preferred lab:       Send INR reminders to:       Comments:   [facility] - [phone]      Anticoagulation Care Providers     Provider Role Specialty Phone number    Bacilio Escalante M.D. Referring Cardiology [phone]    Johnna Johnson, Michael       [facility] Anticoagulation Services Responsible  [phone]        Anticoagulation Patient Findings  Patient Findings     Positives:   Change in diet/appetite    Negatives:   Signs/symptoms of thrombosis, Signs/symptoms of bleeding, Laboratory test error suspected, Change in health, Change in alcohol use, Change in activity, Upcoming invasive procedure, Emergency department visit, Upcoming dental procedure, Missed doses, Extra doses, Change in medications, Hospital admission, Bruising, Other complaints    Comments:   Eating a bit more greens last week        Spoke with patient today regarding subtherapeutic INR of 1.8.  Patient denies any signs/symptoms of bruising or bleeding or any changes in diet and medications.  Instructed patient to call clinic with any questions or concerns.  Instructed patient to bolus with 6mg X 1, then resume current warfarin regimen.  Follow up in 2 weeks, to reduce risk of adverse events related to this high risk medication,  Warfarin.    Vincenzo Overton, MoiraD

## 2019-03-15 ENCOUNTER — ANTICOAGULATION MONITORING (OUTPATIENT)
Dept: VASCULAR LAB | Facility: MEDICAL CENTER | Age: 84
End: 2019-03-15

## 2019-03-15 LAB — INR PPP: 2.5 (ref 2–3.5)

## 2019-03-15 NOTE — PROGRESS NOTES
Anticoagulation Summary  As of 3/15/2019    INR goal:   2.0-3.0   TTR:   77.0 % (3.8 y)   INR used for dosin.5 (3/15/2019)   Warfarin maintenance plan:   2 mg (2 mg x 1) every Wed; 4 mg (2 mg x 2) all other days   Weekly warfarin total:   26 mg   No change documented:   Phan Zapata Ass't   Plan last modified:   Johnna Johnson PharmD (2018)   Next INR check:   3/29/2019   Priority:   Routine   Target end date:   Indefinite    Indications    Atrial fibrillation [427.31] (Resolved) [I48.91]             Anticoagulation Episode Summary     INR check location:   Home Draw    Preferred lab:       Send INR reminders to:       Comments:   [facility] - [phone]      Anticoagulation Care Providers     Provider Role Specialty Phone number    Bacilio Escalante M.D. Referring Cardiology [phone]    Johnna Johnson, Michael       [facility]ticoagulation [facility] Responsible  [phone]        Anticoagulation Patient Findings  Patient Findings     Negatives:   Signs/symptoms of thrombosis, Signs/symptoms of bleeding, Laboratory test error suspected, Change in health, Change in alcohol use, Change in activity, Upcoming invasive procedure, Emergency department visit, Upcoming dental procedure, Missed doses, Extra doses, Change in medications, Change in diet/appetite, Hospital admission, Bruising, Other complaints      Spoke with patient to report a therapeutic INR.  Pt instructed to continue with current warfarin dosing regimen. Pt denies any s/s of bleeding, bruising, clotting or any changes to diet or medication.  Will follow up in 2 weeks.  Phan Zapata Ass't    I have reviewed and am in agreement with the above stated plan on 3-15-19.  Vincenzo Overton, MoiraD

## 2019-04-02 ENCOUNTER — TELEPHONE (OUTPATIENT)
Dept: VASCULAR LAB | Facility: MEDICAL CENTER | Age: 84
End: 2019-04-02

## 2019-04-02 NOTE — TELEPHONE ENCOUNTER
[facility]     Left VM in response to a VM that we received about upcoming procedure.     Kodak Carr, PharmD

## 2019-04-30 ENCOUNTER — ANTICOAGULATION MONITORING (OUTPATIENT)
Dept: VASCULAR LAB | Facility: MEDICAL CENTER | Age: 84
End: 2019-04-30

## 2019-04-30 NOTE — PROGRESS NOTES
Patient's wife left us a voice message saying that he is off of warfarin and on Eliquis.    Oseas Pena, MoiraD

## 2019-05-06 ENCOUNTER — ANTICOAGULATION MONITORING (OUTPATIENT)
Dept: VASCULAR LAB | Facility: MEDICAL CENTER | Age: 84
End: 2019-05-06

## 2019-05-07 NOTE — PROGRESS NOTES
Pt is in [place] and will remain there for at least 1 yr. Pt has esophageal CA with mets and was taken off Warfarin and put on Eliquis. He is being followed by a cardiologist in [place] . Updated next test date to May 2020. DAKOTAH De Leon.

## 2019-05-09 ENCOUNTER — ANTICOAGULATION MONITORING (OUTPATIENT)
Dept: VASCULAR LAB | Facility: MEDICAL CENTER | Age: 84
End: 2019-05-09

## 2022-12-15 NOTE — PROGRESS NOTES
Anticoagulation Summary  As of 2019    INR goal:   2.0-3.0   TTR:   77.3 % (3.8 y)   INR used for dosin.2 (2019)   Warfarin maintenance plan:   2 mg (2 mg x 1) every Wed; 4 mg (2 mg x 2) all other days   Weekly warfarin total:   26 mg   No change documented:   Phan Zapata Ass't   Plan last modified:   Johnna Johnson PharmD (2018)   Next INR check:      Priority:   Routine   Target end date:   Indefinite    Indications    Atrial fibrillation [427.31] (Resolved) [I48.91]             Anticoagulation Episode Summary     INR check location:   Home Draw    Preferred lab:       Send INR reminders to:       Comments:   [facility] - [phone]      Anticoagulation Care Providers     Provider Role Specialty Phone number    Bacilio Escalante M.D. Referring Cardiology [phone]    Johnna Johnson, PharmD       [facility] Anticoagulation Services Responsible  [phone]        Anticoagulation Patient Findings  Patient Findings     Negatives:   Signs/symptoms of thrombosis, Signs/symptoms of bleeding, Laboratory test error suspected, Change in health, Change in alcohol use, Change in activity, Upcoming invasive procedure, Emergency department visit, Upcoming dental procedure, Missed doses, Extra doses, Change in medications, Change in diet/appetite, Hospital admission, Bruising, Other complaints      Left voicemail message to report 2 therapeutic INR of 2.0-3.0.  Patient to continue with current warfarin dosing regimen. Requested pt contact the clinic for any change to diet or medication, and to report any signs or symptoms of bleeding, bruising or clotting.  Pt to follow up in 1 weeks.  Phan Zapata Ass't      I have reviewed and concur with the above plan on 2019.  Ghazal Mckenzie, Clinical Pharmacist     show